# Patient Record
Sex: FEMALE | Race: WHITE | NOT HISPANIC OR LATINO | Employment: OTHER | URBAN - METROPOLITAN AREA
[De-identification: names, ages, dates, MRNs, and addresses within clinical notes are randomized per-mention and may not be internally consistent; named-entity substitution may affect disease eponyms.]

---

## 2017-01-27 ENCOUNTER — HOSPITAL ENCOUNTER (OUTPATIENT)
Dept: RADIOLOGY | Facility: HOSPITAL | Age: 72
Discharge: HOME | End: 2017-01-27
Payer: MEDICARE

## 2017-01-27 ENCOUNTER — LAB (OUTPATIENT)
Dept: LAB | Facility: HOSPITAL | Age: 72
End: 2017-01-27
Payer: MEDICARE

## 2017-01-27 ENCOUNTER — TRANSCRIBE ORDERS (OUTPATIENT)
Dept: RADIOLOGY | Facility: HOSPITAL | Age: 72
End: 2017-01-27

## 2017-01-27 DIAGNOSIS — E03.9 UNSPECIFIED HYPOTHYROIDISM: ICD-10-CM

## 2017-01-27 DIAGNOSIS — S33.8XXA SPRAIN OF SACRUM, INITIAL ENCOUNTER: ICD-10-CM

## 2017-01-27 DIAGNOSIS — M19.90 SENILE ARTHRITIS: ICD-10-CM

## 2017-01-27 DIAGNOSIS — E03.9 UNSPECIFIED HYPOTHYROIDISM: Primary | ICD-10-CM

## 2017-01-27 LAB
T3 SERPL-MCNC: 1 NG/ML (ref 0.6–1.8)
T4 FREE SERPL-MCNC: 1.24 NG/DL (ref 0.76–1.46)
TSH SERPL DL<=0.05 MIU/L-ACNC: 0.06 UIU/ML (ref 0.36–3.74)

## 2017-01-27 PROCEDURE — 72110 X-RAY EXAM L-2 SPINE 4/>VWS: CPT

## 2017-01-27 PROCEDURE — 73562 X-RAY EXAM OF KNEE 3: CPT

## 2017-01-27 PROCEDURE — 84480 ASSAY TRIIODOTHYRONINE (T3): CPT

## 2017-01-27 PROCEDURE — 36415 COLL VENOUS BLD VENIPUNCTURE: CPT

## 2017-01-27 PROCEDURE — 84439 ASSAY OF FREE THYROXINE: CPT

## 2017-01-27 PROCEDURE — 84443 ASSAY THYROID STIM HORMONE: CPT

## 2017-01-27 PROCEDURE — 72200 X-RAY EXAM SI JOINTS: CPT

## 2017-08-14 ENCOUNTER — LAB REQUISITION (OUTPATIENT)
Dept: LAB | Facility: HOSPITAL | Age: 72
End: 2017-08-14
Payer: MEDICARE

## 2017-08-14 DIAGNOSIS — R13.19 OTHER DYSPHAGIA: ICD-10-CM

## 2017-08-14 DIAGNOSIS — D12.7 BENIGN NEOPLASM OF RECTOSIGMOID JUNCTION: ICD-10-CM

## 2017-08-14 DIAGNOSIS — K44.9 DIAPHRAGMATIC HERNIA WITHOUT OBSTRUCTION OR GANGRENE: ICD-10-CM

## 2017-08-14 DIAGNOSIS — Z86.010 HISTORY OF COLONIC POLYPS: ICD-10-CM

## 2017-08-14 PROCEDURE — 88305 TISSUE EXAM BY PATHOLOGIST: CPT | Performed by: INTERNAL MEDICINE

## 2018-12-06 RX ORDER — ALPRAZOLAM 0.5 MG/1
TABLET ORAL
Refills: 5 | COMMUNITY
Start: 2018-11-06

## 2018-12-06 RX ORDER — FINASTERIDE 1 MG/1
1 TABLET, FILM COATED ORAL DAILY
Refills: 11 | COMMUNITY
Start: 2018-11-24

## 2018-12-06 RX ORDER — LEVOTHYROXINE SODIUM 125 UG/1
1 CAPSULE ORAL DAILY
Refills: 3 | COMMUNITY
Start: 2018-09-25 | End: 2018-12-13

## 2018-12-06 RX ORDER — LISINOPRIL 10 MG/1
5 TABLET ORAL DAILY
Refills: 4 | COMMUNITY
Start: 2018-10-09

## 2018-12-11 ENCOUNTER — OFFICE VISIT (OUTPATIENT)
Dept: ENDOCRINOLOGY | Facility: CLINIC | Age: 73
End: 2018-12-11
Payer: COMMERCIAL

## 2018-12-11 VITALS
HEART RATE: 70 BPM | DIASTOLIC BLOOD PRESSURE: 70 MMHG | HEIGHT: 66 IN | BODY MASS INDEX: 26.48 KG/M2 | WEIGHT: 164.8 LBS | SYSTOLIC BLOOD PRESSURE: 116 MMHG

## 2018-12-11 DIAGNOSIS — M85.80 OSTEOPENIA, UNSPECIFIED LOCATION: ICD-10-CM

## 2018-12-11 DIAGNOSIS — E55.9 VITAMIN D DEFICIENCY: ICD-10-CM

## 2018-12-11 DIAGNOSIS — E03.9 HYPOTHYROIDISM, UNSPECIFIED TYPE: Primary | ICD-10-CM

## 2018-12-11 PROCEDURE — 99204 OFFICE O/P NEW MOD 45 MIN: CPT | Performed by: INTERNAL MEDICINE

## 2018-12-11 RX ORDER — ASPIRIN 81 MG/1
81 TABLET ORAL DAILY
COMMUNITY

## 2018-12-11 NOTE — LETTER
December 12, 2018     Mg Mace MD  [address] [place] [address]    Patient: Adryan Elkins   YOB: 1945   Date of Visit: 12/11/2018       Dear Dr Courtney Crespo: Thank you for referring Teresa Longoria to me for evaluation  Below are my notes for this consultation  If you have questions, please do not hesitate to call me  I look forward to following your patient along with you  Sincerely,        Pam Brunner MD        CC: No Recipients  Pam Brunner MD  12/12/2018 12:35 PM  Sign at close encounter   Adryan Elkins 72 y o  female MRN: [id number]    Encounter: [id number]      Assessment/Plan     Problem List Items Addressed This Visit     Hypothyroidism - Primary     Agree with stopping Cytomel as TSH was suppressed  For now continue Tirosint at current dose  I a m  Going to check thyroid function tests-if the TSH is still on the lower end of normal consider cutting back on Tirosint         Relevant Medications    TIROSINT 125 MCG CAPS    Other Relevant Orders    TSH, 3rd generation Lab Collect    T4, free Lab Collect    Comprehensive metabolic panel Lab Collect    Vitamin D deficiency     Continue vitamin-D supplementations-will check vitamin-D 25 hydroxy         Relevant Orders    Vitamin D 25 hydroxy Lab Collect    Comprehensive metabolic panel Lab Collect    Osteopenia     Counseled on taking vitamin-D and calcium supplementations-counseled on fall prevention  Will request a DEXA         Relevant Orders    Comprehensive metabolic panel Lab Collect    DXA bone density spine hip and pelvis        CC:   Hypothyroidism    History of Present Illness     HPI:  81-year-old woman here for evaluation of hypothyroidism  She has had hypothyroidism for many years and  Was on cytomel + tirosnt     Last year TSH was suppressed so Cytomel was discontinued and she is currently on Tirosint 125 mcg daily  Since then she states that she has been noticing Hot flashes at night Weight gain -10 lbs in the past 2 years   Complains of  Fatigue   Sleep issues - cant sleep well for many years   Usually has 3 BM /day -   Feeling more depressed , anxious  No palpitations ,  Heat intolerance  History of osteopenia-no history of fragility fractures   has not taken any vitamin D in the past 2 months   Calcium - 500 mg once  A day - 1 serving of dairy a day         Review of Systems   Constitutional: Positive for fatigue and unexpected weight change  Respiratory: Negative for cough and shortness of breath  Cardiovascular: Negative for palpitations and leg swelling  Gastrointestinal: Negative for diarrhea, nausea and vomiting  Endocrine: Negative for polydipsia and polyuria  Musculoskeletal: Negative for joint swelling  Skin: Negative for color change, pallor, rash and wound  Psychiatric/Behavioral: Positive for dysphoric mood and sleep disturbance  The patient is nervous/anxious  All other systems reviewed and are negative        Historical Information   Past Medical History:   Diagnosis Date    Arthritis     PTSD (post-traumatic stress disorder)      Past Surgical History:   Procedure Laterality Date    BUNIONECTOMY      REPLACEMENT TOTAL KNEE       Social History   History   Alcohol Use    Yes     Comment: Social     History   Drug Use No     History   Smoking Status    Never Smoker   Smokeless Tobacco    Never Used     Family History:   Family History   Problem Relation Age of Onset    Heart disease Mother     Stroke Mother     Lung disease Father        Meds/Allergies   Current Outpatient Prescriptions   Medication Sig Dispense Refill    ALPRAZolam (XANAX) 0 5 mg tablet TAKE 1 TABLET EVERY 8 HOURS AS NEEDED  5    aspirin (ECOTRIN LOW STRENGTH) 81 mg EC tablet Take 81 mg by mouth daily      finasteride (PROPECIA) 1 MG tablet Take 1 mg by mouth daily  11    lisinopril (ZESTRIL) 10 mg tablet Take 10 mg by mouth daily  4    TIROSINT 125 MCG CAPS Take 1 capsule by mouth daily  3     No current facility-administered medications for this visit  No Known Allergies    Objective   Vitals: Blood pressure 116/70, pulse 70, height 5' 6" (1 676 m), weight 74 8 kg (164 lb 12 8 oz)  Physical Exam   Constitutional: She is oriented to person, place, and time  She appears well-developed and well-nourished  No distress  HENT:   Head: Normocephalic and atraumatic  Mouth/Throat: No oropharyngeal exudate  Eyes: Conjunctivae and EOM are normal  No scleral icterus  Neck: Normal range of motion  Neck supple  No thyromegaly present  Cardiovascular: Normal rate, regular rhythm and normal heart sounds  No murmur heard  Pulmonary/Chest: Effort normal and breath sounds normal  No respiratory distress  She has no wheezes  She has no rales  Abdominal: Soft  Bowel sounds are normal  She exhibits no distension  There is no tenderness  There is no rebound  Musculoskeletal: Normal range of motion  She exhibits no edema or deformity  Lymphadenopathy:     She has no cervical adenopathy  Neurological: She is alert and oriented to person, place, and time  Skin: Skin is warm and dry  No rash noted  No erythema  No pallor  Psychiatric: She has a normal mood and affect  Her behavior is normal  Judgment and thought content normal        The history was obtained from the review of the chart, patient  Lab Results:      January 2017-TSH 0 056  Free T4 1 24 April 2017-TSH 0 39      Portions of the record may have been created with voice recognition software  Occasional wrong word or "sound a like" substitutions may have occurred due to the inherent limitations of voice recognition software  Read the chart carefully and recognize, using context, where substitutions have occurred

## 2018-12-11 NOTE — PROGRESS NOTES
Betzy Guzman 72 y o  female MRN: [id number]    Encounter: [id number]      Assessment/Plan     Problem List Items Addressed This Visit     Hypothyroidism - Primary     Agree with stopping Cytomel as TSH was suppressed  For now continue Tirosint at current dose  I a m  Going to check thyroid function tests-if the TSH is still on the lower end of normal consider cutting back on Tirosint         Relevant Medications    TIROSINT 125 MCG CAPS    Other Relevant Orders    TSH, 3rd generation Lab Collect    T4, free Lab Collect    Comprehensive metabolic panel Lab Collect    Vitamin D deficiency     Continue vitamin-D supplementations-will check vitamin-D 25 hydroxy         Relevant Orders    Vitamin D 25 hydroxy Lab Collect    Comprehensive metabolic panel Lab Collect    Osteopenia     Counseled on taking vitamin-D and calcium supplementations-counseled on fall prevention  Will request a DEXA         Relevant Orders    Comprehensive metabolic panel Lab Collect    DXA bone density spine hip and pelvis        CC:   Hypothyroidism    History of Present Illness     HPI:  82-year-old woman here for evaluation of hypothyroidism  She has had hypothyroidism for many years and  Was on cytomel + tirosnt   Last year TSH was suppressed so Cytomel was discontinued and she is currently on Tirosint 125 mcg daily  Since then she states that she has been noticing Hot flashes at night   Weight gain -10 lbs in the past 2 years   Complains of  Fatigue   Sleep issues - cant sleep well for many years   Usually has 3 BM /day -   Feeling more depressed , anxious  No palpitations ,  Heat intolerance  History of osteopenia-no history of fragility fractures   has not taken any vitamin D in the past 2 months   Calcium - 500 mg once  A day - 1 serving of dairy a day         Review of Systems   Constitutional: Positive for fatigue and unexpected weight change  Respiratory: Negative for cough and shortness of breath      Cardiovascular: Negative for palpitations and leg swelling  Gastrointestinal: Negative for diarrhea, nausea and vomiting  Endocrine: Negative for polydipsia and polyuria  Musculoskeletal: Negative for joint swelling  Skin: Negative for color change, pallor, rash and wound  Psychiatric/Behavioral: Positive for dysphoric mood and sleep disturbance  The patient is nervous/anxious  All other systems reviewed and are negative  Historical Information   Past Medical History:   Diagnosis Date    Arthritis     PTSD (post-traumatic stress disorder)      Past Surgical History:   Procedure Laterality Date    BUNIONECTOMY      REPLACEMENT TOTAL KNEE       Social History   History   Alcohol Use    Yes     Comment: Social     History   Drug Use No     History   Smoking Status    Never Smoker   Smokeless Tobacco    Never Used     Family History:   Family History   Problem Relation Age of Onset    Heart disease Mother     Stroke Mother     Lung disease Father        Meds/Allergies   Current Outpatient Prescriptions   Medication Sig Dispense Refill    ALPRAZolam (XANAX) 0 5 mg tablet TAKE 1 TABLET EVERY 8 HOURS AS NEEDED  5    aspirin (ECOTRIN LOW STRENGTH) 81 mg EC tablet Take 81 mg by mouth daily      finasteride (PROPECIA) 1 MG tablet Take 1 mg by mouth daily  11    lisinopril (ZESTRIL) 10 mg tablet Take 10 mg by mouth daily  4    TIROSINT 125 MCG CAPS Take 1 capsule by mouth daily  3     No current facility-administered medications for this visit  No Known Allergies    Objective   Vitals: Blood pressure 116/70, pulse 70, height 5' 6" (1 676 m), weight 74 8 kg (164 lb 12 8 oz)  Physical Exam   Constitutional: She is oriented to person, place, and time  She appears well-developed and well-nourished  No distress  HENT:   Head: Normocephalic and atraumatic  Mouth/Throat: No oropharyngeal exudate  Eyes: Conjunctivae and EOM are normal  No scleral icterus  Neck: Normal range of motion  Neck supple   No thyromegaly present  Cardiovascular: Normal rate, regular rhythm and normal heart sounds  No murmur heard  Pulmonary/Chest: Effort normal and breath sounds normal  No respiratory distress  She has no wheezes  She has no rales  Abdominal: Soft  Bowel sounds are normal  She exhibits no distension  There is no tenderness  There is no rebound  Musculoskeletal: Normal range of motion  She exhibits no edema or deformity  Lymphadenopathy:     She has no cervical adenopathy  Neurological: She is alert and oriented to person, place, and time  Skin: Skin is warm and dry  No rash noted  No erythema  No pallor  Psychiatric: She has a normal mood and affect  Her behavior is normal  Judgment and thought content normal        The history was obtained from the review of the chart, patient  Lab Results:      January 2017-TSH 0 056  Free T4 1 24 April 2017-TSH 0 39      Portions of the record may have been created with voice recognition software  Occasional wrong word or "sound a like" substitutions may have occurred due to the inherent limitations of voice recognition software  Read the chart carefully and recognize, using context, where substitutions have occurred

## 2018-12-12 NOTE — ASSESSMENT & PLAN NOTE
Counseled on taking vitamin-D and calcium supplementations-counseled on fall prevention    Will request a DEXA

## 2018-12-12 NOTE — ASSESSMENT & PLAN NOTE
Agree with stopping Cytomel as TSH was suppressed  For now continue Tirosint at current dose  I a m   Going to check thyroid function tests-if the TSH is still on the lower end of normal consider cutting back on Tirosint

## 2018-12-13 ENCOUNTER — TELEPHONE (OUTPATIENT)
Dept: ENDOCRINOLOGY | Facility: CLINIC | Age: 73
End: 2018-12-13

## 2018-12-13 DIAGNOSIS — E03.9 HYPOTHYROIDISM, UNSPECIFIED TYPE: Primary | ICD-10-CM

## 2018-12-13 LAB
25(OH)D3 SERPL-MCNC: 38 NG/ML (ref 30–100)
ALBUMIN SERPL-MCNC: 4.3 G/DL (ref 3.6–5.1)
ALBUMIN/GLOB SERPL: 1.7 (CALC) (ref 1–2.5)
ALP SERPL-CCNC: 59 U/L (ref 33–130)
ALT SERPL-CCNC: 13 U/L (ref 6–29)
AST SERPL-CCNC: 15 U/L (ref 10–35)
BILIRUB SERPL-MCNC: 0.7 MG/DL (ref 0.2–1.2)
BUN SERPL-MCNC: 18 MG/DL (ref 7–25)
BUN/CREAT SERPL: ABNORMAL (CALC) (ref 6–22)
CALCIUM SERPL-MCNC: 9.3 MG/DL (ref 8.6–10.4)
CHLORIDE SERPL-SCNC: 103 MMOL/L (ref 98–110)
CO2 SERPL-SCNC: 29 MMOL/L (ref 20–32)
CREAT SERPL-MCNC: 0.68 MG/DL (ref 0.6–0.93)
GLOBULIN SER CALC-MCNC: 2.6 G/DL (CALC) (ref 1.9–3.7)
GLUCOSE SERPL-MCNC: 102 MG/DL (ref 65–99)
POTASSIUM SERPL-SCNC: 4.5 MMOL/L (ref 3.5–5.3)
PROT SERPL-MCNC: 6.9 G/DL (ref 6.1–8.1)
SL AMB EGFR AFRICAN AMERICAN: 102 ML/MIN/1.73M2
SL AMB EGFR NON AFRICAN AMERICAN: 88 ML/MIN/1.73M2
SODIUM SERPL-SCNC: 139 MMOL/L (ref 135–146)
T4 FREE SERPL-MCNC: 1.7 NG/DL (ref 0.8–1.8)
TSH SERPL-ACNC: 0.39 MIU/L (ref 0.4–4.5)

## 2018-12-13 RX ORDER — LEVOTHYROXINE SODIUM 112 UG/1
112 CAPSULE ORAL DAILY
Qty: 30 CAPSULE | Refills: 4 | Status: SHIPPED | OUTPATIENT
Start: 2018-12-13 | End: 2019-01-22 | Stop reason: DRUGHIGH

## 2018-12-13 NOTE — PROGRESS NOTES
Please call the patient regarding labs - TSH is slightly low-discontinue Tirosint 125 mcg-start tirosint  112 mcg daily- repeat TSH and free T4 in 6 weeks-vitamin-D okay-continue supplementations  Is this  a fasting glucose?   If so it is slightly high

## 2018-12-13 NOTE — TELEPHONE ENCOUNTER
----- Message from Alta Dolan MD sent at 12/13/2018  9:03 AM EST -----  Please call the patient regarding labs - TSH is slightly low-discontinue Tirosint 125 mcg-start tirosint  112 mcg daily- repeat TSH and free T4 in 6 weeks-vitamin-D okay-continue supplementations  Is this  a fasting glucose?   If so it is slightly high

## 2018-12-13 NOTE — TELEPHONE ENCOUNTER
----- Message from Virginia Rhoades MD sent at 12/13/2018  9:03 AM EST -----  Please call the patient regarding labs - TSH is slightly low-discontinue Tirosint 125 mcg-start tirosint  112 mcg daily- repeat TSH and free T4 in 6 weeks-vitamin-D okay-continue supplementations  Is this  a fasting glucose?   If so it is slightly high

## 2019-01-18 LAB
T4 FREE SERPL-MCNC: 2.1 NG/DL (ref 0.8–1.8)
TSH SERPL-ACNC: 0.13 MIU/L (ref 0.4–4.5)

## 2019-01-21 ENCOUNTER — TRANSCRIBE ORDERS (OUTPATIENT)
Dept: RADIOLOGY | Facility: HOSPITAL | Age: 74
End: 2019-01-21

## 2019-01-21 ENCOUNTER — HOSPITAL ENCOUNTER (OUTPATIENT)
Dept: RADIOLOGY | Facility: HOSPITAL | Age: 74
Discharge: HOME | End: 2019-01-21
Payer: COMMERCIAL

## 2019-01-21 DIAGNOSIS — J18.9 UNRESOLVED PNEUMONIA: Primary | ICD-10-CM

## 2019-01-21 DIAGNOSIS — J18.9 UNRESOLVED PNEUMONIA: ICD-10-CM

## 2019-01-21 PROCEDURE — 71046 X-RAY EXAM CHEST 2 VIEWS: CPT

## 2019-01-22 ENCOUNTER — OFFICE VISIT (OUTPATIENT)
Dept: ENDOCRINOLOGY | Facility: CLINIC | Age: 74
End: 2019-01-22
Payer: COMMERCIAL

## 2019-01-22 VITALS
HEART RATE: 85 BPM | BODY MASS INDEX: 25.97 KG/M2 | WEIGHT: 161.6 LBS | HEIGHT: 66 IN | SYSTOLIC BLOOD PRESSURE: 110 MMHG | DIASTOLIC BLOOD PRESSURE: 78 MMHG

## 2019-01-22 DIAGNOSIS — M85.80 OSTEOPENIA, UNSPECIFIED LOCATION: ICD-10-CM

## 2019-01-22 DIAGNOSIS — E03.9 HYPOTHYROIDISM, UNSPECIFIED TYPE: Primary | ICD-10-CM

## 2019-01-22 DIAGNOSIS — E55.9 VITAMIN D DEFICIENCY: ICD-10-CM

## 2019-01-22 PROCEDURE — 99214 OFFICE O/P EST MOD 30 MIN: CPT | Performed by: NURSE PRACTITIONER

## 2019-01-22 RX ORDER — LEVOTHYROXINE SODIUM 100 UG/1
100 CAPSULE ORAL DAILY
Qty: 30 CAPSULE | Refills: 1 | Status: SHIPPED | OUTPATIENT
Start: 2019-01-22 | End: 2019-03-12 | Stop reason: DRUGHIGH

## 2019-01-22 NOTE — ASSESSMENT & PLAN NOTE
Continue calcium and vitamin d supplementation  Educated on falls prevention and importance of weight bearing exercise  She has dexa scan scheduled for 1/28

## 2019-01-22 NOTE — PROGRESS NOTES
Established Patient Progress Note       Chief Complaint   Patient presents with    Hypothyroidism        History of Present Illness:     Brennen Velasquez is a 72 y o  female with a history of hypothyroidism, vitamin d deficiency, and osteopenia  For the hypothyroidism she is currently taking Tirosint 112 mcg daily  She reports she is taking this regularly and properly  Her most recent thyroid function test showed suppressed TSH with elevated free T4  She reports hair loss, insomnia, and anxiety  She denies palpitations or tremors  For the osteopenia she is taking calcium and vitamin d supplementation  She denies any recent falls or fractures  Patient Active Problem List   Diagnosis    Hypothyroidism    Vitamin D deficiency    Osteopenia      Past Medical History:   Diagnosis Date    Arthritis     PTSD (post-traumatic stress disorder)       Past Surgical History:   Procedure Laterality Date    BUNIONECTOMY      REPLACEMENT TOTAL KNEE        Family History   Problem Relation Age of Onset    Heart disease Mother     Stroke Mother     Lung disease Father      Social History   Substance Use Topics    Smoking status: Never Smoker    Smokeless tobacco: Never Used    Alcohol use Yes      Comment: Social     No Known Allergies    Current Outpatient Prescriptions:     ALPRAZolam (XANAX) 0 5 mg tablet, TAKE 1 TABLET EVERY 8 HOURS AS NEEDED, Disp: , Rfl: 5    aspirin (ECOTRIN LOW STRENGTH) 81 mg EC tablet, Take 81 mg by mouth daily, Disp: , Rfl:     finasteride (PROPECIA) 1 MG tablet, Take 1 mg by mouth daily, Disp: , Rfl: 11    lisinopril (ZESTRIL) 10 mg tablet, Take 10 mg by mouth daily, Disp: , Rfl: 4    Levothyroxine Sodium (TIROSINT) 100 MCG CAPS, Take 1 capsule (100 mcg total) by mouth daily Tirosint, brand necessary, Disp: 30 capsule, Rfl: 1    Review of Systems   Constitutional: Negative for chills and fever  HENT: Negative for sore throat and trouble swallowing      Eyes: Negative for visual disturbance  Respiratory: Negative for shortness of breath  Cardiovascular: Negative for chest pain and palpitations  Gastrointestinal: Negative for abdominal pain, constipation and diarrhea  Endocrine: Negative for cold intolerance, heat intolerance, polydipsia, polyphagia and polyuria  Genitourinary: Negative for frequency  Musculoskeletal: Negative for arthralgias and myalgias  Skin: Negative for rash  Neurological: Negative for dizziness and tremors  Hematological: Negative for adenopathy  Psychiatric/Behavioral: Positive for sleep disturbance  The patient is nervous/anxious  All other systems reviewed and are negative  Physical Exam:  Body mass index is 26 08 kg/m²  /78   Pulse 85   Ht 5' 6" (1 676 m)   Wt 73 3 kg (161 lb 9 6 oz)   BMI 26 08 kg/m²    Wt Readings from Last 3 Encounters:   01/22/19 73 3 kg (161 lb 9 6 oz)   12/11/18 74 8 kg (164 lb 12 8 oz)       Physical Exam   Constitutional: She is oriented to person, place, and time  She appears well-developed and well-nourished  No distress  HENT:   Head: Normocephalic and atraumatic  Eyes: Pupils are equal, round, and reactive to light  Conjunctivae are normal    Neck: Normal range of motion  Neck supple  No thyromegaly present  Cardiovascular: Normal rate, regular rhythm and normal heart sounds  Pulmonary/Chest: Effort normal and breath sounds normal  No respiratory distress  She has no wheezes  She has no rales  Abdominal: Soft  Bowel sounds are normal  She exhibits no distension  There is no tenderness  Musculoskeletal: Normal range of motion  She exhibits no edema  Neurological: She is alert and oriented to person, place, and time  Skin: Skin is warm and dry  Psychiatric: She has a normal mood and affect  Vitals reviewed        Labs:       Component      Latest Ref Rng & Units 1/17/2019   Free T4      0 8 - 1 8 ng/dL 2 1 (H)   TSH, POC      0 40 - 4 50 mIU/L 0 13 (L)       Impression & Plan:    Problem List Items Addressed This Visit     Hypothyroidism - Primary     TSH suppressed with elevated free T4, decrease Tirosint to 100 mcg daily  Repeat TSH and free T4 in 6 weeks  Relevant Medications    Levothyroxine Sodium (TIROSINT) 100 MCG CAPS    Other Relevant Orders    T4, free    TSH, 3rd generation    Vitamin D deficiency     Continue vitamin d supplementation          Osteopenia     Continue calcium and vitamin d supplementation  Educated on falls prevention and importance of weight bearing exercise  She has dexa scan scheduled for 1/28  Orders Placed This Encounter   Procedures    T4, free     Standing Status:   Future     Standing Expiration Date:   1/22/2020    TSH, 3rd generation     This is a patient instruction: This test is non-fasting  Please drink two glasses of water morning of bloodwork  Standing Status:   Future     Standing Expiration Date:   1/22/2020       Discussed with the patient and all questioned fully answered  She will call me if any problems arise      Counseled patient on diagnostic results, prognosis, risk and benefit of treatment options, instruction for management, importance of treatment compliance, Risk  factor reduction and impressions      Gopi Ball [address]

## 2019-01-22 NOTE — ASSESSMENT & PLAN NOTE
TSH suppressed with elevated free T4, decrease Tirosint to 100 mcg daily  Repeat TSH and free T4 in 6 weeks

## 2019-01-28 ENCOUNTER — HOSPITAL ENCOUNTER (OUTPATIENT)
Dept: RADIOLOGY | Age: 74
Discharge: HOME | End: 2019-01-28
Payer: COMMERCIAL

## 2019-01-28 DIAGNOSIS — M85.80 OSTEOPENIA, UNSPECIFIED LOCATION: ICD-10-CM

## 2019-01-28 PROCEDURE — 77080 DXA BONE DENSITY AXIAL: CPT

## 2019-01-30 ENCOUNTER — TELEPHONE (OUTPATIENT)
Dept: ENDOCRINOLOGY | Facility: CLINIC | Age: 74
End: 2019-01-30

## 2019-01-30 NOTE — TELEPHONE ENCOUNTER
----- Message from Carolyne Leigh MD sent at 1/29/2019  7:20 PM EST -----  Please call the patient regarding dexa - osteopenia , continue calcium and vitamin D

## 2019-03-12 DIAGNOSIS — E03.9 HYPOTHYROIDISM, UNSPECIFIED TYPE: Primary | ICD-10-CM

## 2019-03-12 DIAGNOSIS — E03.8 TSH (THYROID-STIMULATING HORMONE DEFICIENCY): Primary | ICD-10-CM

## 2019-03-12 LAB
T4 FREE SERPL-MCNC: 1.6 NG/DL (ref 0.8–1.8)
TSH SERPL-ACNC: 0.24 MIU/L (ref 0.4–4.5)

## 2019-03-12 RX ORDER — LEVOTHYROXINE SODIUM 88 UG/1
CAPSULE ORAL
COMMUNITY
End: 2019-03-12 | Stop reason: DRUGHIGH

## 2019-03-12 RX ORDER — LEVOTHYROXINE SODIUM 88 UG/1
88 CAPSULE ORAL DAILY
Qty: 30 CAPSULE | Refills: 2 | Status: SHIPPED | OUTPATIENT
Start: 2019-03-12 | End: 2019-05-07 | Stop reason: SDUPTHER

## 2019-03-12 NOTE — TELEPHONE ENCOUNTER
Pt is aware of results and medication change and to do blood work in six weeks and mailed out script

## 2019-04-13 LAB
T4 FREE SERPL-MCNC: 1.5 NG/DL (ref 0.8–1.8)
TSH SERPL-ACNC: 0.91 MIU/L (ref 0.4–4.5)

## 2019-04-23 ENCOUNTER — TELEPHONE (OUTPATIENT)
Dept: ENDOCRINOLOGY | Facility: CLINIC | Age: 74
End: 2019-04-23

## 2019-04-25 ENCOUNTER — OFFICE VISIT (OUTPATIENT)
Dept: ENDOCRINOLOGY | Facility: CLINIC | Age: 74
End: 2019-04-25
Payer: COMMERCIAL

## 2019-04-25 VITALS
HEART RATE: 89 BPM | BODY MASS INDEX: 23.46 KG/M2 | DIASTOLIC BLOOD PRESSURE: 70 MMHG | HEIGHT: 66 IN | WEIGHT: 146 LBS | SYSTOLIC BLOOD PRESSURE: 108 MMHG

## 2019-04-25 DIAGNOSIS — E03.9 HYPOTHYROIDISM, UNSPECIFIED TYPE: Primary | ICD-10-CM

## 2019-04-25 DIAGNOSIS — E55.9 VITAMIN D DEFICIENCY: ICD-10-CM

## 2019-04-25 DIAGNOSIS — R73.9 HYPERGLYCEMIA: ICD-10-CM

## 2019-04-25 DIAGNOSIS — M85.80 OSTEOPENIA, UNSPECIFIED LOCATION: ICD-10-CM

## 2019-04-25 PROCEDURE — 99214 OFFICE O/P EST MOD 30 MIN: CPT | Performed by: INTERNAL MEDICINE

## 2019-05-07 DIAGNOSIS — E03.9 HYPOTHYROIDISM, UNSPECIFIED TYPE: ICD-10-CM

## 2019-05-07 RX ORDER — LEVOTHYROXINE SODIUM 88 UG/1
88 CAPSULE ORAL DAILY
Qty: 30 CAPSULE | Refills: 2 | Status: SHIPPED | OUTPATIENT
Start: 2019-05-07

## 2019-06-01 LAB
ALBUMIN SERPL-MCNC: 4.4 G/DL (ref 3.6–5.1)
ALBUMIN/GLOB SERPL: 2 (CALC) (ref 1–2.5)
ALP SERPL-CCNC: 48 U/L (ref 33–130)
ALT SERPL-CCNC: 54 U/L (ref 6–29)
AST SERPL-CCNC: 63 U/L (ref 10–35)
BILIRUB SERPL-MCNC: 0.6 MG/DL (ref 0.2–1.2)
BUN SERPL-MCNC: 12 MG/DL (ref 7–25)
BUN/CREAT SERPL: ABNORMAL (CALC) (ref 6–22)
CALCIUM SERPL-MCNC: 9.6 MG/DL (ref 8.6–10.4)
CHLORIDE SERPL-SCNC: 103 MMOL/L (ref 98–110)
CHOLEST SERPL-MCNC: 254 MG/DL
CHOLEST/HDLC SERPL: 2.3 (CALC)
CO2 SERPL-SCNC: 28 MMOL/L (ref 20–32)
CREAT SERPL-MCNC: 0.68 MG/DL (ref 0.6–0.93)
EST. AVERAGE GLUCOSE BLD GHB EST-MCNC: 103 (CALC)
EST. AVERAGE GLUCOSE BLD GHB EST-SCNC: 5.7 (CALC)
GLOBULIN SER CALC-MCNC: 2.2 G/DL (CALC) (ref 1.9–3.7)
GLUCOSE SERPL-MCNC: 88 MG/DL (ref 65–99)
HBA1C MFR BLD: 5.2 % OF TOTAL HGB
HDLC SERPL-MCNC: 109 MG/DL
LDLC SERPL CALC-MCNC: 127 MG/DL (CALC)
NONHDLC SERPL-MCNC: 145 MG/DL (CALC)
POTASSIUM SERPL-SCNC: 4.8 MMOL/L (ref 3.5–5.3)
PROT SERPL-MCNC: 6.6 G/DL (ref 6.1–8.1)
SL AMB EGFR AFRICAN AMERICAN: 101 ML/MIN/1.73M2
SL AMB EGFR NON AFRICAN AMERICAN: 87 ML/MIN/1.73M2
SODIUM SERPL-SCNC: 142 MMOL/L (ref 135–146)
T4 FREE SERPL-MCNC: 1.6 NG/DL (ref 0.8–1.8)
TRIGL SERPL-MCNC: 84 MG/DL
TSH SERPL-ACNC: 0.96 MIU/L (ref 0.4–4.5)

## 2019-06-06 ENCOUNTER — TELEPHONE (OUTPATIENT)
Dept: ENDOCRINOLOGY | Facility: CLINIC | Age: 74
End: 2019-06-06

## 2019-06-12 ENCOUNTER — TELEPHONE (OUTPATIENT)
Dept: ENDOCRINOLOGY | Facility: CLINIC | Age: 74
End: 2019-06-12

## 2019-08-14 ENCOUNTER — LAB REQUISITION (OUTPATIENT)
Dept: LAB | Facility: HOSPITAL | Age: 74
End: 2019-08-14
Payer: COMMERCIAL

## 2019-08-14 ENCOUNTER — TRANSCRIBE ORDERS (OUTPATIENT)
Dept: ADMINISTRATIVE | Facility: HOSPITAL | Age: 74
End: 2019-08-14

## 2019-08-14 DIAGNOSIS — R10.2 PERINEAL NEURALGIA, UNSPECIFIED LATERALITY: Primary | ICD-10-CM

## 2019-08-14 DIAGNOSIS — R94.5 ABNORMAL RESULTS OF LIVER FUNCTION STUDIES: ICD-10-CM

## 2019-08-14 LAB
ALBUMIN SERPL BCP-MCNC: 4.2 G/DL (ref 3.5–5)
ALP SERPL-CCNC: 62 U/L (ref 46–116)
ALT SERPL W P-5'-P-CCNC: 44 U/L (ref 12–78)
ANION GAP SERPL CALCULATED.3IONS-SCNC: 9 MMOL/L (ref 4–13)
AST SERPL W P-5'-P-CCNC: 40 U/L (ref 5–45)
BILIRUB SERPL-MCNC: 0.79 MG/DL (ref 0.2–1)
BUN SERPL-MCNC: 14 MG/DL (ref 5–25)
CALCIUM SERPL-MCNC: 9.4 MG/DL (ref 8.3–10.1)
CHLORIDE SERPL-SCNC: 103 MMOL/L (ref 100–108)
CO2 SERPL-SCNC: 26 MMOL/L (ref 21–32)
CREAT SERPL-MCNC: 0.66 MG/DL (ref 0.6–1.3)
GFR SERPL CREATININE-BSD FRML MDRD: 89 ML/MIN/1.73SQ M
GLUCOSE SERPL-MCNC: 83 MG/DL (ref 65–140)
POTASSIUM SERPL-SCNC: 4.7 MMOL/L (ref 3.5–5.3)
PROT SERPL-MCNC: 7.4 G/DL (ref 6.4–8.2)
SODIUM SERPL-SCNC: 138 MMOL/L (ref 136–145)

## 2019-08-14 PROCEDURE — 80053 COMPREHEN METABOLIC PANEL: CPT | Performed by: INTERNAL MEDICINE

## 2019-08-21 ENCOUNTER — HOSPITAL ENCOUNTER (OUTPATIENT)
Dept: RADIOLOGY | Age: 74
Discharge: HOME | End: 2019-08-21
Payer: COMMERCIAL

## 2019-08-21 DIAGNOSIS — R10.2 PERINEAL NEURALGIA, UNSPECIFIED LATERALITY: ICD-10-CM

## 2019-08-21 PROCEDURE — 76830 TRANSVAGINAL US NON-OB: CPT

## 2019-08-21 PROCEDURE — 76856 US EXAM PELVIC COMPLETE: CPT

## 2019-09-04 ENCOUNTER — OFFICE VISIT (OUTPATIENT)
Dept: OBGYN CLINIC | Facility: CLINIC | Age: 74
End: 2019-09-04
Payer: COMMERCIAL

## 2019-09-04 VITALS
DIASTOLIC BLOOD PRESSURE: 78 MMHG | HEIGHT: 65 IN | BODY MASS INDEX: 22.26 KG/M2 | WEIGHT: 133.6 LBS | SYSTOLIC BLOOD PRESSURE: 148 MMHG

## 2019-09-04 DIAGNOSIS — R93.89 ENDOMETRIAL THICKENING ON ULTRASOUND: Primary | ICD-10-CM

## 2019-09-04 PROCEDURE — 88305 TISSUE EXAM BY PATHOLOGIST: CPT | Performed by: PATHOLOGY

## 2019-09-04 PROCEDURE — 99204 OFFICE O/P NEW MOD 45 MIN: CPT | Performed by: OBSTETRICS & GYNECOLOGY

## 2019-09-06 ENCOUNTER — TELEPHONE (OUTPATIENT)
Dept: OBGYN CLINIC | Facility: CLINIC | Age: 74
End: 2019-09-06

## 2019-09-06 NOTE — TELEPHONE ENCOUNTER
----- Message from Brett Sen MD sent at 9/6/2019  1:08 PM EDT -----  Notify benign! No follow up necessary

## 2019-09-08 PROCEDURE — 58100 BIOPSY OF UTERUS LINING: CPT | Performed by: OBSTETRICS & GYNECOLOGY

## 2019-09-08 NOTE — PROGRESS NOTES
GYN Problem Visit    HPI:  Patient is a 69 y o postmenopausal female who was found to have lower abdominal/pelvic tenderness of exam by PCP  Pelvic sono was ordered which revealed 6mm endometrial stripe  She denies any VB since the menopause  Pelvic sono was otherwise normal       OB History        3    Para   2    Term                AB   1    Living           SAB        TAB        Ectopic        Multiple        Live Births                     Past Medical History:   Diagnosis Date    Arthritis     PTSD (post-traumatic stress disorder)        Past Surgical History:   Procedure Laterality Date    BUNIONECTOMY Right     KNEE ARTHROSCOPY W/ MENISCAL REPAIR Right 2009    REPLACEMENT TOTAL KNEE Right 2017         Current Outpatient Medications:     ALPRAZolam (XANAX) 0 5 mg tablet, TAKE 1 TABLET EVERY 8 HOURS AS NEEDED, Disp: , Rfl: 5    finasteride (PROPECIA) 1 MG tablet, Take 1 mg by mouth daily, Disp: , Rfl: 11    Levothyroxine Sodium 88 MCG CAPS, Take 1 capsule (88 mcg total) by mouth daily, Disp: 30 capsule, Rfl: 2    lisinopril (ZESTRIL) 10 mg tablet, Take 5 mg by mouth daily , Disp: , Rfl: 4    aspirin (ECOTRIN LOW STRENGTH) 81 mg EC tablet, Take 81 mg by mouth daily, Disp: , Rfl:     No Known Allergies    Social History     Tobacco Use    Smoking status: Former Smoker     Last attempt to quit: 1977     Years since quittin 0    Smokeless tobacco: Never Used   Substance Use Topics    Alcohol use: Yes     Comment: Social    Drug use: No       Family History   Problem Relation Age of Onset    Heart disease Mother     Stroke Mother     Lung disease Father      Review of Systems   Constitution: Negative for decreased appetite, fever, malaise/fatigue and weight loss  Musculoskeletal: Negative for arthritis, back pain, joint pain and muscle weakness  Gastrointestinal: Negative for bloating, abdominal pain, change in bowel habit and nausea     Genitourinary: Negative for bladder incontinence, dysuria, pelvic pain and urgency  Vitals:    09/04/19 1002   BP: 148/78       Physical Exam   Constitutional: She is oriented to person, place, and time  She appears well-developed and well-nourished  Genitourinary: Vagina normal  No vaginal discharge found  HENT:   Head: Normocephalic  Cardiovascular: Normal rate  Pulmonary/Chest: Effort normal    Abdominal: Soft  There is no tenderness  Musculoskeletal: She exhibits no edema  Neurological: She is alert and oriented to person, place, and time  Skin: Skin is warm and dry  Psychiatric: She has a normal mood and affect  Vitals reviewed  Impression: Thickened endometrium on sono - asymptomatic    Plan:  Discussed need to proceed with EMB to prove no occult malignancy  If adequate and benign no further follow up necessary  If inadequate or atypical plan D&C/HSC

## 2019-09-08 NOTE — PROGRESS NOTES
Endometrial biopsy  Date/Time: 9/8/2019 2:27 PM  Performed by: Manish Haro MD  Authorized by: Manish Haro MD     Consent:     Consent obtained:  Written and verbal    Consent given by:  Patient    Procedural risks discussed:  Bleeding, infection and repeat procedure    Patient questions answered: yes      Patient agrees, verbalizes understanding, and wants to proceed: yes    Indication:     Indications comment:   Thickened endometrium on sono  Procedure:     Procedure: endometrial biopsy with Pipelle      A bivalve speculum was placed in the vagina: yes      Cervix cleaned and prepped: yes      The cervix was dilated: no      Uterus sounded: yes      Uterus sound depth (cm):  7    Specimen collected: specimen collected and sent to pathology    Findings:     Uterus size:  Non-gravid    Cervix: normal      Adnexa: normal

## 2021-02-25 ENCOUNTER — VBI (OUTPATIENT)
Dept: ADMINISTRATIVE | Facility: OTHER | Age: 76
End: 2021-02-25

## 2021-03-03 ENCOUNTER — IMMUNIZATIONS (OUTPATIENT)
Dept: FAMILY MEDICINE CLINIC | Facility: HOSPITAL | Age: 76
End: 2021-03-03

## 2021-03-03 DIAGNOSIS — Z23 ENCOUNTER FOR IMMUNIZATION: Primary | ICD-10-CM

## 2021-03-03 PROCEDURE — 91300 SARS-COV-2 / COVID-19 MRNA VACCINE (PFIZER-BIONTECH) 30 MCG: CPT

## 2021-03-03 PROCEDURE — 0001A SARS-COV-2 / COVID-19 MRNA VACCINE (PFIZER-BIONTECH) 30 MCG: CPT

## 2021-03-24 ENCOUNTER — IMMUNIZATIONS (OUTPATIENT)
Dept: FAMILY MEDICINE CLINIC | Facility: HOSPITAL | Age: 76
End: 2021-03-24

## 2021-03-24 DIAGNOSIS — Z23 ENCOUNTER FOR IMMUNIZATION: Primary | ICD-10-CM

## 2021-03-24 PROCEDURE — 0002A SARS-COV-2 / COVID-19 MRNA VACCINE (PFIZER-BIONTECH) 30 MCG: CPT

## 2021-03-24 PROCEDURE — 91300 SARS-COV-2 / COVID-19 MRNA VACCINE (PFIZER-BIONTECH) 30 MCG: CPT

## 2021-11-23 ENCOUNTER — NEW PATIENT (OUTPATIENT)
Dept: URBAN - METROPOLITAN AREA CLINIC 6 | Facility: CLINIC | Age: 76
End: 2021-11-23

## 2021-11-23 DIAGNOSIS — Z96.1: ICD-10-CM

## 2021-11-23 DIAGNOSIS — H35.3131: ICD-10-CM

## 2021-11-23 DIAGNOSIS — H18.513: ICD-10-CM

## 2021-11-23 PROCEDURE — 92250 FUNDUS PHOTOGRAPHY W/I&R: CPT

## 2021-11-23 PROCEDURE — 92004 COMPRE OPH EXAM NEW PT 1/>: CPT

## 2021-11-23 PROCEDURE — 92015 DETERMINE REFRACTIVE STATE: CPT

## 2021-11-23 PROCEDURE — 2019F DILATED MACUL EXAM DONE: CPT

## 2021-11-23 PROCEDURE — G8427 DOCREV CUR MEDS BY ELIG CLIN: HCPCS

## 2021-11-23 PROCEDURE — 1036F TOBACCO NON-USER: CPT

## 2021-11-23 ASSESSMENT — VISUAL ACUITY
OD_CC: J5
OD_SC: 20/40
OD_CC: 20/60
OS_SC: 20/30
OD_PH: 20/50+1
OS_CC: J5
OS_CC: 20/40

## 2021-11-23 ASSESSMENT — TONOMETRY
OD_IOP_MMHG: 12
OS_IOP_MMHG: 12

## 2022-04-25 ENCOUNTER — APPOINTMENT (OUTPATIENT)
Dept: LAB | Facility: CLINIC | Age: 77
End: 2022-04-25
Payer: COMMERCIAL

## 2022-04-25 ENCOUNTER — TRANSCRIBE ORDERS (OUTPATIENT)
Dept: LAB | Facility: CLINIC | Age: 77
End: 2022-04-25

## 2022-04-25 DIAGNOSIS — I10 ESSENTIAL HYPERTENSION: ICD-10-CM

## 2022-04-25 DIAGNOSIS — E78.5 HYPERLIPIDEMIA, UNSPECIFIED HYPERLIPIDEMIA TYPE: Primary | ICD-10-CM

## 2022-04-25 DIAGNOSIS — E03.9 HYPOTHYROIDISM, ADULT: ICD-10-CM

## 2022-04-25 DIAGNOSIS — E78.5 HYPERLIPIDEMIA, UNSPECIFIED HYPERLIPIDEMIA TYPE: ICD-10-CM

## 2022-04-25 LAB
ABO GROUP BLD: NORMAL
ALBUMIN SERPL BCP-MCNC: 3.9 G/DL (ref 3.5–5)
ALP SERPL-CCNC: 55 U/L (ref 46–116)
ALT SERPL W P-5'-P-CCNC: 31 U/L (ref 12–78)
ANION GAP SERPL CALCULATED.3IONS-SCNC: 7 MMOL/L (ref 4–13)
AST SERPL W P-5'-P-CCNC: 28 U/L (ref 5–45)
BILIRUB SERPL-MCNC: 0.85 MG/DL (ref 0.2–1)
BUN SERPL-MCNC: 15 MG/DL (ref 5–25)
CALCIUM SERPL-MCNC: 9.5 MG/DL (ref 8.3–10.1)
CHLORIDE SERPL-SCNC: 106 MMOL/L (ref 100–108)
CHOLEST SERPL-MCNC: 237 MG/DL
CO2 SERPL-SCNC: 27 MMOL/L (ref 21–32)
CREAT SERPL-MCNC: 0.88 MG/DL (ref 0.6–1.3)
ERYTHROCYTE [DISTWIDTH] IN BLOOD BY AUTOMATED COUNT: 13.2 % (ref 11.6–15.1)
GFR SERPL CREATININE-BSD FRML MDRD: 64 ML/MIN/1.73SQ M
GLUCOSE P FAST SERPL-MCNC: 92 MG/DL (ref 65–99)
HCT VFR BLD AUTO: 43.9 % (ref 34.8–46.1)
HDLC SERPL-MCNC: 113 MG/DL
HGB BLD-MCNC: 14.3 G/DL (ref 11.5–15.4)
LDLC SERPL CALC-MCNC: 106 MG/DL (ref 0–100)
MCH RBC QN AUTO: 32.2 PG (ref 26.8–34.3)
MCHC RBC AUTO-ENTMCNC: 32.6 G/DL (ref 31.4–37.4)
MCV RBC AUTO: 99 FL (ref 82–98)
NONHDLC SERPL-MCNC: 124 MG/DL
PLATELET # BLD AUTO: 195 THOUSANDS/UL (ref 149–390)
PMV BLD AUTO: 9.9 FL (ref 8.9–12.7)
POTASSIUM SERPL-SCNC: 4.3 MMOL/L (ref 3.5–5.3)
PROT SERPL-MCNC: 7.2 G/DL (ref 6.4–8.2)
RBC # BLD AUTO: 4.44 MILLION/UL (ref 3.81–5.12)
RH BLD: POSITIVE
SODIUM SERPL-SCNC: 140 MMOL/L (ref 136–145)
TRIGL SERPL-MCNC: 92 MG/DL
TSH SERPL DL<=0.05 MIU/L-ACNC: 0.39 UIU/ML (ref 0.45–4.5)
WBC # BLD AUTO: 3.22 THOUSAND/UL (ref 4.31–10.16)

## 2022-04-25 PROCEDURE — 85027 COMPLETE CBC AUTOMATED: CPT

## 2022-04-25 PROCEDURE — 80053 COMPREHEN METABOLIC PANEL: CPT

## 2022-04-25 PROCEDURE — 86900 BLOOD TYPING SEROLOGIC ABO: CPT

## 2022-04-25 PROCEDURE — 36415 COLL VENOUS BLD VENIPUNCTURE: CPT

## 2022-04-25 PROCEDURE — 80061 LIPID PANEL: CPT

## 2022-04-25 PROCEDURE — 84443 ASSAY THYROID STIM HORMONE: CPT

## 2022-04-25 PROCEDURE — 86901 BLOOD TYPING SEROLOGIC RH(D): CPT

## 2022-12-12 ENCOUNTER — 1 YEAR FOLLOW-UP (OUTPATIENT)
Dept: URBAN - METROPOLITAN AREA CLINIC 6 | Facility: CLINIC | Age: 77
End: 2022-12-12

## 2022-12-12 DIAGNOSIS — Z96.1: ICD-10-CM

## 2022-12-12 DIAGNOSIS — H18.513: ICD-10-CM

## 2022-12-12 DIAGNOSIS — H35.3131: ICD-10-CM

## 2022-12-12 PROCEDURE — 92014 COMPRE OPH EXAM EST PT 1/>: CPT

## 2022-12-12 ASSESSMENT — VISUAL ACUITY
OS_CC: 20/25
OD_CC: 20/40

## 2022-12-12 ASSESSMENT — TONOMETRY
OD_IOP_MMHG: 12
OS_IOP_MMHG: 13

## 2023-02-02 ENCOUNTER — APPOINTMENT (OUTPATIENT)
Dept: LAB | Facility: CLINIC | Age: 78
End: 2023-02-02

## 2023-02-02 ENCOUNTER — TRANSCRIBE ORDERS (OUTPATIENT)
Dept: LAB | Facility: CLINIC | Age: 78
End: 2023-02-02

## 2023-02-02 DIAGNOSIS — E78.5 HYPERLIPIDEMIA, UNSPECIFIED HYPERLIPIDEMIA TYPE: Primary | ICD-10-CM

## 2023-02-02 DIAGNOSIS — I10 ESSENTIAL HYPERTENSION: ICD-10-CM

## 2023-02-02 DIAGNOSIS — R00.2 PALPITATIONS: ICD-10-CM

## 2023-02-02 DIAGNOSIS — E78.5 HYPERLIPIDEMIA, UNSPECIFIED HYPERLIPIDEMIA TYPE: ICD-10-CM

## 2023-02-02 DIAGNOSIS — E03.9 HYPOTHYROIDISM, ADULT: ICD-10-CM

## 2023-02-02 LAB
ALBUMIN SERPL BCP-MCNC: 4 G/DL (ref 3.5–5)
ALP SERPL-CCNC: 55 U/L (ref 46–116)
ALT SERPL W P-5'-P-CCNC: 23 U/L (ref 12–78)
ANION GAP SERPL CALCULATED.3IONS-SCNC: 8 MMOL/L (ref 4–13)
AST SERPL W P-5'-P-CCNC: 16 U/L (ref 5–45)
BILIRUB SERPL-MCNC: 0.63 MG/DL (ref 0.2–1)
BUN SERPL-MCNC: 7 MG/DL (ref 5–25)
CALCIUM SERPL-MCNC: 9.6 MG/DL (ref 8.3–10.1)
CHLORIDE SERPL-SCNC: 107 MMOL/L (ref 96–108)
CHOLEST SERPL-MCNC: 268 MG/DL
CO2 SERPL-SCNC: 27 MMOL/L (ref 21–32)
CREAT SERPL-MCNC: 0.78 MG/DL (ref 0.6–1.3)
ERYTHROCYTE [DISTWIDTH] IN BLOOD BY AUTOMATED COUNT: 11.6 % (ref 11.6–15.1)
GFR SERPL CREATININE-BSD FRML MDRD: 74 ML/MIN/1.73SQ M
GLUCOSE P FAST SERPL-MCNC: 91 MG/DL (ref 65–99)
HCT VFR BLD AUTO: 43.7 % (ref 34.8–46.1)
HDLC SERPL-MCNC: 58 MG/DL
HGB BLD-MCNC: 14.2 G/DL (ref 11.5–15.4)
LDLC SERPL CALC-MCNC: 187 MG/DL (ref 0–100)
MCH RBC QN AUTO: 32.5 PG (ref 26.8–34.3)
MCHC RBC AUTO-ENTMCNC: 32.5 G/DL (ref 31.4–37.4)
MCV RBC AUTO: 100 FL (ref 82–98)
NONHDLC SERPL-MCNC: 210 MG/DL
PLATELET # BLD AUTO: 253 THOUSANDS/UL (ref 149–390)
PMV BLD AUTO: 10.2 FL (ref 8.9–12.7)
POTASSIUM SERPL-SCNC: 3.7 MMOL/L (ref 3.5–5.3)
PROT SERPL-MCNC: 7.4 G/DL (ref 6.4–8.4)
RBC # BLD AUTO: 4.37 MILLION/UL (ref 3.81–5.12)
SODIUM SERPL-SCNC: 142 MMOL/L (ref 135–147)
T4 FREE SERPL-MCNC: 1.44 NG/DL (ref 0.76–1.46)
TRIGL SERPL-MCNC: 115 MG/DL
TSH SERPL DL<=0.05 MIU/L-ACNC: 0.31 UIU/ML (ref 0.45–4.5)
WBC # BLD AUTO: 3.89 THOUSAND/UL (ref 4.31–10.16)

## 2023-02-15 ENCOUNTER — HOSPITAL ENCOUNTER (OUTPATIENT)
Dept: NON INVASIVE DIAGNOSTICS | Facility: CLINIC | Age: 78
Discharge: HOME | End: 2023-02-15

## 2023-02-15 DIAGNOSIS — I49.1 PREMATURE ATRIAL CONTRACTIONS: ICD-10-CM

## 2023-03-29 NOTE — PROGRESS NOTES
Cardiology Clinic Note    Jake Cardona 70 y o  female   MRN: [id number]  Encounter: [id number]        Assessment / Plan:    # Irregular heart beats / heart racing  EKG --> from today shows sinus rhythm with sinus arrhythmia   Holter --> sinus rhythm  Occasional PACs (1 3%)  Rare PVCs (0 8%)  TSH was low with normal free T4  Possibly contributing  Recently lowered thyroid medication to address this  Check magnesium  Check echo to r/o structural heart disease  Could eventually consider beta blocker to see if helps with symptoms  May eventually want to check ziopatch to have longer duration monitor to r/o any afib  # HTN  On lisinopril 5mg QD in the past but recently self discontinued  BP today is elevated at 146/84 mmHg  Doing home monitoring with partner who is ER physician  BP has been acceptable at home off the lisinopril  Continue to monitor  # HLD  Lipid panel reviewed from 2/2/23:       The LDL is very elevated  ASCVD 29%  Statin would be recommended in this setting  The patient is hesitant to start statin, wants to check LP(a) first so we will order that and discuss again at future visits  # hypothyroidism  Recent TSH low but with normal free T4  Recently adjusted meds with PCP      Today's Plan Summary:  See above assessment/plan for full details of today's plan  Briefly,     Magnesium  LP(a)  echo          Reason For Visit / Chief Complaint:  Self referral - for palpitations with recent holter monitor results to review    HPI:   Jake Cardona is a 70 y o   female with history as noted in the problem list and further detailed in the above assessment and plan  Self referral - for palpitations with recent holter monitor results to review  The patient has a hx of HTN  The patient has HLD not currently on treatment  Was in usual state of health until late January of this year   At the gym fitbit said  and then suddenly 64 bpm   Then the HR fluctuated back and forth a bit  She had lightheadedness and had to sit down and ultimately leave the gym  Her partner (ER physician) checked the pulse when she got home and it was irreg irregular  Saw PCP next day  EKG showed sinus arrhthymia with PACs  holter was ordered  The holter monitor is now back -- showed sinus rhythm with 1 3% PACs (at times in atrial bigeminy pattern) and 0 8% PVC's  Recently stopped lisinopril for dizziness with possible low BP  Partner checking BP at home manually off the lisinopril  Today, she reports she feels well  Did have heart racing feeling in [place] but not since  Otherwise, no symptoms and not really having palpitations  Only knows about PACs from fitbit  No chest pain  No SOB or RAMOS  No edema  No orthopnea or PND  No syncope    Partner is ED physician  Patient is very active with golf and at the gym  Prior competitive  and prior into road biking  Eats very healthy vegetarian diet  Quit smoking 45 years ago  rare ETOH  1 cup coffee per day  Retired  Used to be a full professor  PhD in clinical social work  Cardiac Imaging personally reviewed:  EKG 3-30-23  Sinus rhythm with sinus arrhythmia  Nonspecific ST abnormality  Holter or event monitor 2-15-23  Sinus rhythm  Avg HR 77 bpm ()  Occasional PACs - 1 3%  often in pattern of bigeminy  5 atrial runs  Rare PVCs - 0 8%           Echo    ADELA    Cardiac MRI    Stress testing    Coronary CTA or [facility]    RHC    CPET              Patient Active Problem List    Diagnosis Date Noted   • Primary hypertension 03/30/2023   • Mixed hyperlipidemia 03/30/2023   • PAC (premature atrial contraction) 03/30/2023   • Hyperglycemia 04/25/2019   • Hypothyroidism 12/11/2018   • Vitamin D deficiency 12/11/2018   • Osteopenia 12/11/2018       Past Medical History:   Diagnosis Date   • Arthritis    • PTSD (post-traumatic stress disorder)        Review of Systems Constitutional: Negative for chills and fever  HENT: Negative for nosebleeds  Gastrointestinal: Negative for abdominal distention, abdominal pain and blood in stool  Neurological: Positive for dizziness  Negative for syncope  Psychiatric/Behavioral: Negative for confusion  14-point ROS completed and negative except as stated above and/or in the HPI  No Known Allergies    Current Outpatient Medications   Medication Instructions   • ALPRAZolam (XANAX) 0 5 mg tablet TAKE 1 TABLET EVERY 8 HOURS AS NEEDED   • levothyroxine 75 mcg, Oral, Daily   • Multiple Vitamin (THERAGRAN PO) Oral       Social History     Socioeconomic History   • Marital status:      Spouse name: Not on file   • Number of children: Not on file   • Years of education: Not on file   • Highest education level: Not on file   Occupational History   • Not on file   Tobacco Use   • Smoking status: Former     Types: Cigarettes     Quit date: 1977     Years since quittin 5   • Smokeless tobacco: Never   Substance and Sexual Activity   • Alcohol use: Yes     Comment: Social   • Drug use: No   • Sexual activity: Not Currently     Partners: Male   Other Topics Concern   • Not on file   Social History Narrative   • Not on file     Social Determinants of Health     Financial Resource Strain: Not on file   Food Insecurity: Not on file   Transportation Needs: Not on file   Physical Activity: Not on file   Stress: Not on file   Social Connections: Not on file   Intimate Partner Violence: Not on file   Housing Stability: Not on file       Family History   Problem Relation Age of Onset   • Heart disease Mother    • Stroke Mother    • Lung disease Father         black lung disease   • Heart attack Father         age 58       Physical Exam:  Blood pressure 146/84, pulse 80, weight 67 1 kg (148 lb)  Body mass index is 24 44 kg/m²    Wt Readings from Last 3 Encounters:   23 67 1 kg (148 lb)   19 60 6 kg (133 lb 9 6 oz)   19 66 2 kg (146 lb)     Physical Exam  Vitals reviewed  Constitutional:       General: She is not in acute distress  Appearance: She is not toxic-appearing  HENT:      Head: Normocephalic and atraumatic  Eyes:      General: No scleral icterus  Conjunctiva/sclera: Conjunctivae normal    Neck:      Vascular: No carotid bruit  Comments: No JVP elevation  Cardiovascular:      Rate and Rhythm: Normal rate and regular rhythm  Heart sounds: No murmur heard  No friction rub  No gallop  Comments: At times irregular beats appreciated  Pulmonary:      Breath sounds: Normal breath sounds  No wheezing, rhonchi or rales  Abdominal:      General: There is no distension  Palpations: Abdomen is soft  Tenderness: There is no abdominal tenderness  There is no guarding  Musculoskeletal:      Right lower leg: No edema  Left lower leg: No edema  Skin:     Coloration: Skin is not jaundiced or pale  Findings: No erythema  Neurological:      Mental Status: She is alert  Mental status is at baseline  Psychiatric:         Mood and Affect: Mood normal          Behavior: Behavior normal          Labs & Results:  Lab Results   Component Value Date    SODIUM 142 02/02/2023    K 3 7 02/02/2023     02/02/2023    CO2 27 02/02/2023    BUN 7 02/02/2023    CREATININE 0 78 02/02/2023    GLUC 83 08/14/2019    CALCIUM 9 6 02/02/2023       Thank you for the opportunity to participate in the care of this patient      Mary Clayton MD, [facility] [place]  Advanced Heart Failure and Transplant Cardiologist  Director of [facility]

## 2023-03-30 ENCOUNTER — OFFICE VISIT (OUTPATIENT)
Dept: CARDIOLOGY CLINIC | Facility: CLINIC | Age: 78
End: 2023-03-30

## 2023-03-30 ENCOUNTER — LAB (OUTPATIENT)
Dept: LAB | Facility: CLINIC | Age: 78
End: 2023-03-30

## 2023-03-30 VITALS
DIASTOLIC BLOOD PRESSURE: 84 MMHG | BODY MASS INDEX: 24.44 KG/M2 | WEIGHT: 148 LBS | SYSTOLIC BLOOD PRESSURE: 146 MMHG | HEART RATE: 80 BPM

## 2023-03-30 DIAGNOSIS — I49.1 PAC (PREMATURE ATRIAL CONTRACTION): ICD-10-CM

## 2023-03-30 DIAGNOSIS — I49.1 PAC (PREMATURE ATRIAL CONTRACTION): Primary | ICD-10-CM

## 2023-03-30 DIAGNOSIS — E78.2 MIXED HYPERLIPIDEMIA: ICD-10-CM

## 2023-03-30 DIAGNOSIS — I10 PRIMARY HYPERTENSION: ICD-10-CM

## 2023-03-30 LAB — MAGNESIUM SERPL-MCNC: 2 MG/DL (ref 1.6–2.6)

## 2023-03-30 RX ORDER — LEVOTHYROXINE SODIUM 0.07 MG/1
75 TABLET ORAL DAILY
COMMUNITY
Start: 2023-02-03

## 2023-03-30 NOTE — PATIENT INSTRUCTIONS
Get blood work to check magnesium and LP(a)    Get an echocardiogram     Then follow up with cardiology

## 2023-03-31 LAB — LPA SERPL-SCNC: 44.1 NMOL/L

## 2023-05-03 NOTE — PROGRESS NOTES
Cardiology Clinic Note    Tres Montalvo 70 y o  female   MRN: [id number]  Encounter: [id number]        Assessment / Plan:    # Irregular heart beats / heart racing  EKG --> from last visit showed sinus rhythm with sinus arrhythmia   Holter --> sinus rhythm  Occasional PACs (1 3%)  Rare PVCs (0 8%)  TSH was low with normal free T4  Possibly contributing  Recently lowered thyroid medication to address this  Echo -->  Normal LVEF  Sx's improved  Hold off on beta blocker at this time  Check ziopatch to have longer duration monitoring and r/o any occult afib    # HTN  On lisinopril 5mg QD in the past but recently self discontinued (now on vegetarian diet)  BP today is well controlled off medications  Doing home monitoring with partner who is ER physician  # HLD  Lipid panel reviewed from 2/2/23:       LP(a) -  44  The LDL is very elevated  ASCVD 18-29%  Statin would be recommended in this setting  Declines  Wants to meet with lipid expert which I am supportive of  # Aortic sclerosis without significant stenosis  Yearly exam   Echo as clinically indicated  # Mild mitral annular calcification with mild MR   Yearly exam   Echo as clinically indicated  # hypothyroidism  Recent TSH low but with normal free T4  Recently adjusted meds with PCP      Today's Plan Summary:  See above assessment/plan for full details of today's plan  Briefly,     Check brett  Wants to meet with lipid specialist to further discuss her LDL          Reason For Visit / Chief Complaint:  F/u palpitations    HPI:   Tres Montalvo is a 70 y o   female with history as noted in the problem list and further detailed in the above assessment and plan  Initial:  3/2023  Self referral - for palpitations with recent holter monitor results to review  The patient has a hx of HTN  The patient has HLD not currently on treatment  Was in usual state of health until late January of this year   At the [employer] said  and then suddenly 64 bpm   Then the HR fluctuated back and forth a bit  She had lightheadedness and had to sit down and ultimately leave the gym  Her partner (ER physician) checked the pulse when she got home and it was irreg irregular  Saw PCP next day  EKG showed sinus arrhthymia with PACs  holter was ordered  The holter monitor is now back -- showed sinus rhythm with 1 3% PACs (at times in atrial bigeminy pattern) and 0 8% PVC's  Recently stopped lisinopril for dizziness with possible low BP  Partner checking BP at home manually off the lisinopril  Today, she reports she feels well  Did have heart racing feeling in [place] but not since  Otherwise, no symptoms and not really having palpitations  Only knows about PACs from fitbit  No chest pain  No SOB or RAMOS  No edema  No orthopnea or PND  No syncope    Partner is ED physician  Patient is very active with golf and at the gym  Prior competitive  and prior into road biking  Eats very healthy vegetarian diet  Quit smoking 45 years ago  rare ETOH  1 cup coffee per day  Retired  Used to be a full professor  PhD in clinical social work  Interval:  At first visit -->  rec magnesium, LP(a), and echo    Mag - normal  LP(a) - 44    Echo -  EF 00%   Diastolic function abnormal relaxation  Normal RV size and function  Aortic sclerosis without significant stenosis  Mild mitral annular calcification with mild MR  Today - reports palpitations are better lately  Recently had 1 palpitation event at the gym (mostly identified by fitbit)  No syncope  No edema  No SOB or RAMOS  No chest pain  Occasional rare lightheadedness upon standing  Cardiac Imaging personally reviewed:  EKG 3-30-23  Sinus rhythm with sinus arrhythmia  Nonspecific ST abnormality  Holter or event monitor 2-15-23  Sinus rhythm  Avg HR 77 bpm ()  Occasional PACs - 1 3%  often in pattern of bigeminy    5 atrial runs  Rare PVCs - 0 8%  Echo Echo - 04/10/23  EF 84%   Diastolic function abnormal relaxation  Normal RV size and function  Aortic sclerosis without significant stenosis  Mild mitral annular calcification with mild MR        ADELA    Cardiac MRI    Stress testing    Coronary CTA or [facility]    RHC    CPET              Patient Active Problem List    Diagnosis Date Noted    Primary hypertension 2023    Mixed hyperlipidemia 2023    PAC (premature atrial contraction) 2023    Hyperglycemia 2019    Hypothyroidism 2018    Vitamin D deficiency 2018    Osteopenia 2018       Past Medical History:   Diagnosis Date    Arthritis     PTSD (post-traumatic stress disorder)        No Known Allergies    Current Outpatient Medications   Medication Instructions    ALPRAZolam (XANAX) 0 5 mg tablet TAKE 1 TABLET EVERY 8 HOURS AS NEEDED    levothyroxine 75 mcg, Oral, Daily    Multiple Vitamin (THERAGRAN PO) Oral       Social History     Socioeconomic History    Marital status:       Spouse name: Not on file    Number of children: Not on file    Years of education: Not on file    Highest education level: Not on file   Occupational History    Not on file   Tobacco Use    Smoking status: Former     Types: Cigarettes     Quit date: 1977     Years since quittin 6    Smokeless tobacco: Never   Substance and Sexual Activity    Alcohol use: Yes     Comment: Social    Drug use: No    Sexual activity: Not Currently     Partners: Male   Other Topics Concern    Not on file   Social History Narrative    Not on file     Social Determinants of Health     Financial Resource Strain: Not on file   Food Insecurity: Not on file   Transportation Needs: Not on file   Physical Activity: Not on file   Stress: Not on file   Social Connections: Not on file   Intimate Partner Violence: Not on file   Housing Stability: Not on file       Family History   Problem Relation Age of Onset    Heart disease Mother     Stroke Mother     Lung disease Father         black lung disease    Heart attack Father         age 58       Physical Exam:  Blood pressure 128/70, pulse 75, weight 67 7 kg (149 lb 4 8 oz), SpO2 98 %  Body mass index is 27 31 kg/m²  Wt Readings from Last 3 Encounters:   05/04/23 67 7 kg (149 lb 4 8 oz)   04/10/23 67 1 kg (148 lb)   03/30/23 67 1 kg (148 lb)     Physical Exam  Vitals reviewed  Constitutional:       General: She is not in acute distress  Appearance: She is not toxic-appearing  Neck:      Vascular: No carotid bruit  Comments: No JVP elevation  Cardiovascular:      Rate and Rhythm: Normal rate and regular rhythm  Heart sounds: No murmur heard  No friction rub  No gallop  Pulmonary:      Breath sounds: Normal breath sounds  No wheezing, rhonchi or rales  Abdominal:      General: There is no distension  Palpations: Abdomen is soft  Tenderness: There is no abdominal tenderness  There is no guarding  Musculoskeletal:      Right lower leg: No edema  Left lower leg: No edema  Neurological:      Mental Status: She is alert  Labs & Results:  Lab Results   Component Value Date    SODIUM 142 02/02/2023    K 3 7 02/02/2023     02/02/2023    CO2 27 02/02/2023    BUN 7 02/02/2023    CREATININE 0 78 02/02/2023    GLUC 83 08/14/2019    CALCIUM 9 6 02/02/2023       Thank you for the opportunity to participate in the care of this patient      [place], MD, [facility] [place]  Advanced Heart Failure and Transplant Cardiologist  Director of [facility]red[facility]

## 2023-05-04 ENCOUNTER — OFFICE VISIT (OUTPATIENT)
Dept: CARDIOLOGY CLINIC | Facility: CLINIC | Age: 78
End: 2023-05-04

## 2023-05-04 VITALS
DIASTOLIC BLOOD PRESSURE: 70 MMHG | OXYGEN SATURATION: 98 % | SYSTOLIC BLOOD PRESSURE: 128 MMHG | BODY MASS INDEX: 27.31 KG/M2 | HEART RATE: 75 BPM | WEIGHT: 149.3 LBS

## 2023-05-04 DIAGNOSIS — I10 PRIMARY HYPERTENSION: ICD-10-CM

## 2023-05-04 DIAGNOSIS — E78.2 MIXED HYPERLIPIDEMIA: ICD-10-CM

## 2023-05-04 DIAGNOSIS — I49.1 PAC (PREMATURE ATRIAL CONTRACTION): Primary | ICD-10-CM

## 2023-05-04 DIAGNOSIS — R00.2 PALPITATIONS: ICD-10-CM

## 2023-05-08 ENCOUNTER — TELEPHONE (OUTPATIENT)
Dept: CARDIOLOGY CLINIC | Facility: CLINIC | Age: 78
End: 2023-05-08

## 2023-05-08 NOTE — TELEPHONE ENCOUNTER
----- Message from Raffi Sanchez sent at 5/5/2023  2:29 PM EDT -----  Regarding: DIANAO  Per German Munoz is not required for both a 1W [id number] or a 2W [id number]  Per [facility] RTE registration, this patient has outpatient hospital benefits  This is a valid and billable code

## 2023-06-01 ENCOUNTER — CLINICAL SUPPORT (OUTPATIENT)
Dept: CARDIOLOGY CLINIC | Facility: CLINIC | Age: 78
End: 2023-06-01

## 2023-06-01 DIAGNOSIS — I49.1 PAC (PREMATURE ATRIAL CONTRACTION): ICD-10-CM

## 2023-06-01 DIAGNOSIS — R00.2 PALPITATIONS: ICD-10-CM

## 2023-06-01 NOTE — RESULT ENCOUNTER NOTE
Zio patch - Extended Holter Monitor Report  Analysis time (after artifact removed) - 13 days, 14 hours    Predominant underlying rhythm was Sinus Rhythm  Avg HR when in sinus rhythm was 77 bpm ()  Frequent PAC's (11 0%)  101 atrial runs  Fastest 7 beats (182 bpm)  Longest 21 seconds (avg rate 132 bpm)  Occasional PVC's (1 3%)  1 brief run of NSVT (4 beats)  No atrial fibrillation  Paient reported symptoms correlated with:  only 1 trigger in setting of a PVC (pt tells me she did not press the button at all so perhaps this was unintentional)  Reviewed all rhythm strips personally      Court Su MD

## 2023-06-02 ENCOUNTER — TELEPHONE (OUTPATIENT)
Dept: CARDIOLOGY CLINIC | Facility: CLINIC | Age: 78
End: 2023-06-02

## 2023-06-02 NOTE — TELEPHONE ENCOUNTER
Patient stopped by the office and dropped of a letter that contains questions about her test results  Please see the attachment

## 2023-07-06 ENCOUNTER — OFFICE VISIT (OUTPATIENT)
Dept: CARDIOLOGY CLINIC | Facility: CLINIC | Age: 78
End: 2023-07-06
Payer: COMMERCIAL

## 2023-07-06 VITALS
HEART RATE: 80 BPM | SYSTOLIC BLOOD PRESSURE: 150 MMHG | BODY MASS INDEX: 27.55 KG/M2 | DIASTOLIC BLOOD PRESSURE: 88 MMHG | WEIGHT: 150.6 LBS

## 2023-07-06 DIAGNOSIS — E78.5 DYSLIPIDEMIA: Primary | ICD-10-CM

## 2023-07-06 DIAGNOSIS — R00.2 PALPITATION: ICD-10-CM

## 2023-07-06 PROCEDURE — 99204 OFFICE O/P NEW MOD 45 MIN: CPT | Performed by: INTERNAL MEDICINE

## 2023-07-06 NOTE — PROGRESS NOTES
Cardiology Clinic Note    Vanessa Roldan 70 y.o. female   MRN: [id number]  Encounter: [id number]        Assessment / Plan:    # PAC's / atrial runs  EKG -->  sinus rhythm with sinus arrhythmia   Holter --> sinus rhythm. Occasional PACs (1.3%). Rare PVCs (0.8%). zio -->  11% PACs. 101 atrial runs (longest 21 sec). No definitive sustained afib. Echo -->  Normal EF  Rec stopping caffeine. Likely at a higher risk of developing afib. I would recommend at a minimum yearly ziopatch. Declines trial of beta blocker at this time. Pt (and partner who is medical doctor) would like to see EP. I will place referral.     # HTN  On lisinopril 5mg QD in the past but recently self discontinued (now on plant based diet). BP today is elevated. Recommend BB which could help with above and also help lower BP, declines for today. Doing home monitoring with partner who is ER physician. # HLD  Lipid panel 2/2/23:   . . LP(a) -  44  The LDL is very elevated. ASCVD 18-29%. Statin would be recommended in this setting. Patient declines. She saw a lipid specialist and statin also recommended at that time. Some testing was ordered but not completed (NMR and hsCRP). # Aortic sclerosis without significant stenosis  Yearly exam.  Echo as clinically indicated. # Mild mitral annular calcification with mild MR   Yearly exam.  Echo as clinically indicated. # hypothyroidism  Recent TSH low but with normal free T4  Recently adjusted meds with PCP      Today's Plan Summary:  See above assessment/plan for full details of today's plan. Briefly,     referral to EP          Reason For Visit / Chief Complaint:  F/u palpitations, HLD    HPI:   Vanessa Roldan is a 70 y.o.  female with history as noted in the problem list and further detailed in the above assessment and plan. Initial:  3/2023  Self referral - for palpitations with recent holter monitor results to review. The patient has a hx of HTN. The patient has HLD not currently on treatment. Was in usual state of health until late January of this year. At the gym fitbit said  and then suddenly 64 bpm.  Then the HR fluctuated back and forth a bit. She had lightheadedness and had to sit down and ultimately leave the gym. Her partner (ER physician) checked the pulse when she got home and it was irreg irregular. Saw PCP next day. EKG showed sinus arrhthymia with PACs. holter was ordered. The holter monitor is now back -- showed sinus rhythm with 1.3% PACs (at times in atrial bigeminy pattern) and 0.8% PVC's. Recently stopped lisinopril for dizziness with possible low BP. Partner checking BP at home manually off the lisinopril. Today, she reports she feels well. Did have heart racing feeling in [place] but not since. Otherwise, no symptoms and not really having palpitations. Only knows about PACs from fitbit. No chest pain. No SOB or RAMOS. No edema. No orthopnea or PND. No syncope. . Partner is ED physician. Patient is very active with golf and at the gym. Prior competitive  and prior into road biking. Eats very healthy vegetarian diet. Quit smoking 45 years ago. rare ETOH. 1 cup coffee per day. Retired. Used to be a full professor. PhD in clinical social work. Interval:  Last visit -> palpitations are better lately. Recently had 1 palpitation event at the gym (mostly identified by fitbit). No syncope. Last visit plan -->  Check zipatch  Wants to meet with lipid specialist to further discuss her LDL    Lipid specialist - Dr. Ignacio Munguia. Note reviewed. Statin recommended. Plan order NMR and hsCRP. Labs were never completed. Zio patch    Predominant underlying rhythm was Sinus Rhythm. Avg HR when in sinus rhythm was 77 bpm (). Frequent PAC's (11.0%). 101 atrial runs.  Fastest 7 beats (182 bpm). Longest 21 seconds (avg rate 132 bpm). Occasional PVC's (1.3%).   1 brief run of NSVT (4 beats)  No definitive atrial fibrillation. Cale reported symptoms correlated with:  only 1 trigger in setting of a PVC (pt tells me she did not press the button at all so perhaps this was unintentional)    Today - comes in with significant other (who is a medical doctor). The patient had a one minute episode on golf course - dizziness / lightheadedness / SOB / loss of power. Pt notes it was hot that day and felt better getting in air conditioning. Concern is that this was an atrial arrhythmia. No syncope. No edema. No orthopnea or PND. No chest pain. Cardiac Imaging personally reviewed:  EKG 3-30-23  Sinus rhythm with sinus arrhythmia. Nonspecific ST abnormality. Holter or event monitor 2-15-23  Sinus rhythm. Avg HR 77 bpm (). Occasional PACs - 1.3%. often in pattern of bigeminy. 5 atrial runs. Rare PVCs - 0.8%. Zio patch    Predominant underlying rhythm was Sinus Rhythm. Avg HR when in sinus rhythm was 77 bpm (). Frequent PAC's (11.0%). 101 atrial runs.  Fastest 7 beats (182 bpm). Longest 21 seconds (avg rate 132 bpm). Occasional PVC's (1.3%). 1 brief run of NSVT (4 beats)  No atrial fibrillation. Cale reported symptoms correlated with:  only 1 trigger in setting of a PVC (pt tells me she did not press the button at all so perhaps this was unintentional)     Echo Echo - 04/10/23  EF 60%.  Diastolic function abnormal relaxation. Normal RV size and function. Aortic sclerosis without significant stenosis.   Mild mitral annular calcification with mild MR.       ADELA    Cardiac MRI    Stress testing    [facility]    CPET              Patient Active Problem List    Diagnosis Date Noted   • Primary hypertension 03/30/2023   • Mixed hyperlipidemia 03/30/2023   • PAC (premature atrial contraction) 03/30/2023   • Hyperglycemia 04/25/2019   • Hypothyroidism 12/11/2018   • Vitamin D deficiency 12/11/2018   • Osteopenia 12/11/2018       Past Medical History:   Diagnosis Date   • Arthritis    • PTSD (post-traumatic stress disorder)        No Known Allergies    Current Outpatient Medications   Medication Instructions   • ALPRAZolam (XANAX) 0.5 mg tablet TAKE 1 TABLET EVERY 8 HOURS AS NEEDED   • levothyroxine 75 mcg, Oral, Daily   • Multiple Vitamin (THERAGRAN PO) Oral       Social History     Socioeconomic History   • Marital status:      Spouse name: Not on file   • Number of children: Not on file   • Years of education: Not on file   • Highest education level: Not on file   Occupational History   • Not on file   Tobacco Use   • Smoking status: Former     Types: Cigarettes     Quit date: 1977     Years since quittin.8   • Smokeless tobacco: Never   Substance and Sexual Activity   • Alcohol use: Yes     Comment: Social   • Drug use: No   • Sexual activity: Not Currently     Partners: Male   Other Topics Concern   • Not on file   Social History Narrative   • Not on file     Social Determinants of Health     Financial Resource Strain: Not on file   Food Insecurity: Not on file   Transportation Needs: Not on file   Physical Activity: Not on file   Stress: Not on file   Social Connections: Not on file   Intimate Partner Violence: Not on file   Housing Stability: Not on file       Family History   Problem Relation Age of Onset   • Heart disease Mother    • Stroke Mother    • Lung disease Father         black lung disease   • Heart attack Father         age 58       Physical Exam:  Blood pressure 150/78, pulse 76, weight 66.7 kg (147 lb), SpO2 97 %. Body mass index is 26.89 kg/m². Wt Readings from Last 3 Encounters:   07/10/23 66.7 kg (147 lb)   23 68.3 kg (150 lb 9.6 oz)   23 67.7 kg (149 lb 4.8 oz)     Physical Exam  Vitals reviewed. Constitutional:       General: She is not in acute distress. Appearance: She is not toxic-appearing.    Neck:      Comments: JVP not elevated  Cardiovascular:      Rate and Rhythm: Normal rate and regular rhythm. Heart sounds: No murmur heard. No friction rub. No gallop. Pulmonary:      Breath sounds: Normal breath sounds. No wheezing, rhonchi or rales. Musculoskeletal:      Right lower leg: No edema. Left lower leg: No edema. Neurological:      Mental Status: She is alert. Labs & Results:  Lab Results   Component Value Date    SODIUM 142 02/02/2023    K 3.7 02/02/2023     02/02/2023    CO2 27 02/02/2023    BUN 7 02/02/2023    CREATININE 0.78 02/02/2023    GLUC 83 08/14/2019    CALCIUM 9.6 02/02/2023       Thank you for the opportunity to participate in the care of this patient.     Kendrick Quick MD, [facility]  Advanced Heart Failure and Transplant Cardiologist  Director of [address] [place] [address]

## 2023-07-06 NOTE — PROGRESS NOTES
Cardiology             Loida Huerta  1945  [id number]          Lipid clinic consultation        Assessment/Plan:    Dyslipidemia: 10-year risk of ASCVD is elevated at 23%. Had a very long discussion with the patient and her partner was a medical background about the implications of elevated LDL cholesterol, and more importantly, her elevated 10-year risk of ASCVD. She would like to avoid statin therapy if able and is requesting an NMR panel as well as a hs-CRP for further evaluation and restratification. I will order the studies for her. Her partner seems to think that "statins are a con" as he has been listening to Dr. Rina Peng, through the internet and youtube. I have told Maya Kennedy that she would likely benefit with statin therapy even if her small dense LDL particle size is within normal limits, considering her elevated ASCVD risk score, family history of ASCVD and moderate to severely elevated LDL cholesterol at 187 mg/dL on prior lipid panel 2/2023. I explained to her that an NMR would likely not change my management recommendations, but her partner was very demanding on what he wanted me to order in the blood work. I agreed to order the hsCRP and NMR. He disrespectfully demanded I order a normal CRP incase the hsCRP was elevated and I refused, recommending she should follow up with her PCP if she wanted to look for noncardiac causes of inflammation incase her hsCRP Is elevated. She will follow up with me over the phone after completion of her blood testing to discuss results over the phone. Follow up with primary cardiologist        Interval History: This is a 51-year-old female who follows with Dr. Annie Murcia for history of palpitations relating to PACs. She also has hypertension, dyslipidemia, aortic sclerosis, and mild mitral regurgitation as well as a history of hypothyroidism.     Her last lipid panel 2/2/2023 revealed LDL cholesterol 187 mg/dL with normal LP(a) at 44.  Continue risk of ASCVD was calculated up to 23%. Statin therapy was recommended by her primary cardiologist but she declined. She presents today for a consultation to lipid clinic. She presents with her partner who is a medical background, possibly retired surgeon. They both request an NMR profile to look at the lipid particle breakdown numbers. From a symptomatic standpoint she feels well without any exertional symptoms. She admits to family history of CABG in her father. She is a non-smoker and is vegetarian. She tries to eat a healthy diet and exercise regularly. Vitals:  Vitals:    23 0957   BP: 150/88   BP Location: Left arm   Patient Position: Sitting   Cuff Size: Adult   Pulse: 80   Weight: 68.3 kg (150 lb 9.6 oz)         Past Medical History:   Diagnosis Date   • Arthritis    • PTSD (post-traumatic stress disorder)      Social History     Socioeconomic History   • Marital status:       Spouse name: Not on file   • Number of children: Not on file   • Years of education: Not on file   • Highest education level: Not on file   Occupational History   • Not on file   Tobacco Use   • Smoking status: Former     Types: Cigarettes     Quit date: 1977     Years since quittin.8   • Smokeless tobacco: Never   Substance and Sexual Activity   • Alcohol use: Yes     Comment: Social   • Drug use: No   • Sexual activity: Not Currently     Partners: Male   Other Topics Concern   • Not on file   Social History Narrative   • Not on file     Social Determinants of Health     Financial Resource Strain: Not on file   Food Insecurity: Not on file   Transportation Needs: Not on file   Physical Activity: Not on file   Stress: Not on file   Social Connections: Not on file   Intimate Partner Violence: Not on file   Housing Stability: Not on file      Family History   Problem Relation Age of Onset   • Heart disease Mother    • Stroke Mother    • Lung disease Father         black lung disease   • Heart attack Father         age 58     Past Surgical History:   Procedure Laterality Date   • BUNIONECTOMY Right    • KNEE ARTHROSCOPY W/ MENISCAL REPAIR Right 2009   • REPLACEMENT TOTAL KNEE Right 2017       Current Outpatient Medications:   •  ALPRAZolam (XANAX) 0.5 mg tablet, TAKE 1 TABLET EVERY 8 HOURS AS NEEDED, Disp: , Rfl: 5  •  levothyroxine 75 mcg tablet, Take 75 mcg by mouth daily, Disp: , Rfl:   •  Multiple Vitamin (THERAGRAN PO), Take by mouth, Disp: , Rfl:         Review of Systems:  Review of Systems   Constitutional: Negative for activity change, fever and unexpected weight change. HENT: Negative for facial swelling, nosebleeds and voice change. Respiratory: Negative for chest tightness, shortness of breath and wheezing. Cardiovascular: Negative for chest pain, palpitations and leg swelling. Gastrointestinal: Negative for abdominal distention. Genitourinary: Negative for hematuria. Musculoskeletal: Negative for arthralgias. Skin: Negative for color change, pallor, rash and wound. Neurological: Negative for dizziness, seizures and syncope. Psychiatric/Behavioral: Negative for agitation. Physical Exam:  Physical Exam  Vitals reviewed. Constitutional:       Appearance: She is well-developed. HENT:      Head: Normocephalic and atraumatic. Cardiovascular:      Rate and Rhythm: Normal rate and regular rhythm. Heart sounds: Normal heart sounds. Pulmonary:      Effort: Pulmonary effort is normal.      Breath sounds: Normal breath sounds. Abdominal:      Palpations: Abdomen is soft. Musculoskeletal:         General: Normal range of motion. Cervical back: Normal range of motion and neck supple. Skin:     General: Skin is warm and dry. Neurological:      Mental Status: She is alert and oriented to person, place, and time. Psychiatric:         Behavior: Behavior normal.         Thought Content:  Thought content normal.         Judgment: Judgment normal.         This note was completed in part utilizing M-Modal Fluency Direct Software. Grammatical errors, random word insertions, spelling mistakes, and incomplete sentences can be an occasional consequence of this system secondary to software limitations, ambient noise, and hardware issues. If you have any questions or concerns about the content, text, or information contained within the body of this dictation, please contact the provider for clarification.

## 2023-07-10 ENCOUNTER — OFFICE VISIT (OUTPATIENT)
Dept: CARDIOLOGY CLINIC | Facility: CLINIC | Age: 78
End: 2023-07-10
Payer: COMMERCIAL

## 2023-07-10 VITALS
HEART RATE: 76 BPM | BODY MASS INDEX: 26.89 KG/M2 | OXYGEN SATURATION: 97 % | WEIGHT: 147 LBS | SYSTOLIC BLOOD PRESSURE: 150 MMHG | DIASTOLIC BLOOD PRESSURE: 78 MMHG

## 2023-07-10 DIAGNOSIS — I10 PRIMARY HYPERTENSION: ICD-10-CM

## 2023-07-10 DIAGNOSIS — E78.5 DYSLIPIDEMIA: ICD-10-CM

## 2023-07-10 DIAGNOSIS — I49.1 PAC (PREMATURE ATRIAL CONTRACTION): Primary | ICD-10-CM

## 2023-07-10 PROCEDURE — 99214 OFFICE O/P EST MOD 30 MIN: CPT | Performed by: INTERNAL MEDICINE

## 2023-07-20 ENCOUNTER — CONSULT (OUTPATIENT)
Dept: CARDIOLOGY CLINIC | Facility: CLINIC | Age: 78
End: 2023-07-20
Payer: COMMERCIAL

## 2023-07-20 ENCOUNTER — TELEPHONE (OUTPATIENT)
Dept: CARDIOLOGY CLINIC | Facility: CLINIC | Age: 78
End: 2023-07-20

## 2023-07-20 VITALS
WEIGHT: 148.2 LBS | SYSTOLIC BLOOD PRESSURE: 140 MMHG | DIASTOLIC BLOOD PRESSURE: 82 MMHG | BODY MASS INDEX: 27.11 KG/M2 | HEART RATE: 70 BPM

## 2023-07-20 DIAGNOSIS — I49.1 PAC (PREMATURE ATRIAL CONTRACTION): ICD-10-CM

## 2023-07-20 PROCEDURE — 99214 OFFICE O/P EST MOD 30 MIN: CPT | Performed by: INTERNAL MEDICINE

## 2023-07-20 PROCEDURE — 93000 ELECTROCARDIOGRAM COMPLETE: CPT | Performed by: INTERNAL MEDICINE

## 2023-07-20 RX ORDER — DILTIAZEM HYDROCHLORIDE 120 MG/1
120 CAPSULE, EXTENDED RELEASE ORAL DAILY
Qty: 30 CAPSULE | Refills: 3 | Status: SHIPPED | OUTPATIENT
Start: 2023-07-20

## 2023-07-20 RX ORDER — LEVOTHYROXINE SODIUM 0.05 MG/1
50 TABLET ORAL DAILY
COMMUNITY
Start: 2023-07-10

## 2023-07-20 NOTE — PATIENT INSTRUCTIONS
Obtain 2 week monitor    Start diltiazem 120 mg daily 3-4 days after placing monitor    Obtain thyroid blood work

## 2023-07-20 NOTE — TELEPHONE ENCOUNTER
Antonio Lombardi   Did you call pt, she left a message wasn't able to get to the phone, she said she thinks it was Dr Mehul Skelton calling, but I guessing not.   Let me know thx

## 2023-07-20 NOTE — PROGRESS NOTES
EPS Consultation/New Patient Evaluation - Jose Humphries 70 y.o. female MRN: [id number]       Referring: Dr. Heath Shown    CC/HPI:   It was a pleasure to see Jose Humphries in our arrhythmia clinic at [facility]. As you know she is a 70 y.o. woman with arthritis, PTSD who presents to discuss management of PVCs. Patient had Holter monitor which revealed occasional PACs with 1.3% burden and rare PVCs. 2-week cardiac event monitor was performed which revealed 11% PAC burden. She also had short runs of atrial tachycardia. Echocardiogram was normal and she was recommended to stop caffeine. She declined trial of beta-blocker and preferred to discuss further management with electrophysiology. She presents with her friend who is an ER physician and has training in surgery. Patient reports having palpitations and at times dizziness. When she does not feel palpitation all the time however only when her heart is racing. She has noted heart racing up to 120 bpm.  She has cut down on her caffeine intake and consumes a vegan diet. She will also decrease her alcohol intake as well. She denies any swelling in lower extremity, orthopnea, PND or syncope.     Past Medical History:  Past Medical History:   Diagnosis Date   • Arthritis    • PTSD (post-traumatic stress disorder)        Medications:      Current Outpatient Medications:   •  ALPRAZolam (XANAX) 0.5 mg tablet, TAKE 1 TABLET EVERY 8 HOURS AS NEEDED, Disp: , Rfl: 5  •  diltiazem (DILACOR XR) 120 MG 24 hr capsule, Take 1 capsule (120 mg total) by mouth daily, Disp: 30 capsule, Rfl: 3  •  Multiple Vitamin (THERAGRAN PO), Take by mouth, Disp: , Rfl:   •  levothyroxine 50 mcg tablet, Take 50 mcg by mouth daily, Disp: , Rfl:      Family History   Problem Relation Age of Onset   • Heart disease Mother    • Stroke Mother    • Lung disease Father         black lung disease   • Heart attack Father         age 58     Social History Socioeconomic History   • Marital status:      Spouse name: Not on file   • Number of children: Not on file   • Years of education: Not on file   • Highest education level: Not on file   Occupational History   • Not on file   Tobacco Use   • Smoking status: Former     Types: Cigarettes     Quit date: 1977     Years since quittin.9   • Smokeless tobacco: Never   Substance and Sexual Activity   • Alcohol use: Yes     Comment: Social   • Drug use: No   • Sexual activity: Not Currently     Partners: Male   Other Topics Concern   • Not on file   Social History Narrative   • Not on file     Social Determinants of Health     Financial Resource Strain: Not on file   Food Insecurity: Not on file   Transportation Needs: Not on file   Physical Activity: Not on file   Stress: Not on file   Social Connections: Not on file   Intimate Partner Violence: Not on file   Housing Stability: Not on file     Social History     Tobacco Use   Smoking Status Former   • Types: Cigarettes   • Quit date: 1977   • Years since quittin.9   Smokeless Tobacco Never     Social History     Substance and Sexual Activity   Alcohol Use Yes    Comment: Social       Review of Systems   Constitutional: Negative for chills and fever. HENT: Negative. Eyes: Negative for blurred vision and double vision. Cardiovascular: Positive for palpitations. Negative for chest pain, dyspnea on exertion, leg swelling, near-syncope, orthopnea, paroxysmal nocturnal dyspnea and syncope. Respiratory: Negative for cough and sputum production. Endocrine: Negative. Skin: Negative. Negative for rash. Musculoskeletal: Negative. Negative for arthritis and joint pain. Gastrointestinal: Negative for abdominal pain, nausea and vomiting. Genitourinary: Negative. Neurological: Positive for dizziness. Negative for light-headedness. Psychiatric/Behavioral: Negative. The patient is not nervous/anxious.          Objective:     Vitals: Blood pressure 140/82, pulse 70, weight 67.2 kg (148 lb 3.2 oz). , Body mass index is 27.11 kg/m². ,        Physical Exam:    GEN: Gracia Garcia appears well, alert and oriented x 3, pleasant and cooperative   HEENT: pupils equal, round, and reactive to light; extraocular muscles intact  NECK: supple, no carotid bruits   HEART: regular rhythm, normal S1 and S2, no murmurs, clicks, gallops or rubs   LUNGS: clear to auscultation bilaterally; no wheezes, rales, or rhonchi   ABDOMEN: normal bowel sounds, soft, no tenderness, no distention  EXTREMITIES: peripheral pulses normal; no clubbing, cyanosis, or edema  NEURO: no focal findings   SKIN: normal without suspicious lesions on exposed skin      Labs & Results:  Below is the patient's most recent value for Albumin, ALT, AST, BUN, Calcium, Chloride, Cholesterol, CO2, Creatinine, GFR, Glucose, HDL, Hematocrit, Hemoglobin, Hemoglobin A1C, LDL, Magnesium, Phosphorus, Platelets, Potassium, PSA, Sodium, Triglycerides, and WBC. Lab Results   Component Value Date    ALT 23 02/02/2023    AST 16 02/02/2023    BUN 7 02/02/2023    CALCIUM 9.6 02/02/2023     02/02/2023    CO2 27 02/02/2023    CREATININE 0.78 02/02/2023    HDL 58 02/02/2023    HCT 43.7 02/02/2023    HGB 14.2 02/02/2023    HGBA1C 5.2 05/31/2019    MG 2.0 03/30/2023     02/02/2023    K 3.7 02/02/2023    TRIG 115 02/02/2023    WBC 3.89 (L) 02/02/2023     Note: for a comprehensive list of the patient's lab results, access the Results Review activity. Cardiac testing:     I personally reviewed the ECG performed in the clinic on 07/20/23. It reveals normal sinus rhythm with atrial bigeminy. Echocardiograms:  No results found for this or any previous visit. No results found for this or any previous visit. Catheterizations:   No results found for this or any previous visit. Stress Tests:  No results found for this or any previous visit.       Holter monitor -   No results found for this or any previous visit. No results found for this or any previous visit. ASSESSMENT/PLAN:      1. PACs  Recent cardiac event monitor revealed 11% burden  Beta-blocker was offered but patient declined  Currently EKG shows atrial bigeminy  She occasionally feels palpitations and dizziness  We discussed neck steps in management including AV marla blocking agents versus antiarrhythmic therapy  We will start her on diltiazem 120 mg daily  Obtain 2-week cardiac event monitor  Patient will start diltiazem 3 to 4 days into the cardiac event monitor and we will assess response to the medication  Emphasized that atrial bigeminy is benign and should not lead to atrial fibrillation unless she has other risk factors including alcohol, caffeine, infection, sleep apnea    2. Hypertension  Borderline normotensive in the office  Patient was offered lisinopril in the past but she self discontinued  She will check her BP at home on daily basis and keep a log of it    3.   Hyperlipidemia  Elevated LDL levels  Further management per primary cardiologist

## 2023-07-28 ENCOUNTER — CLINICAL SUPPORT (OUTPATIENT)
Dept: CARDIOLOGY CLINIC | Facility: CLINIC | Age: 78
End: 2023-07-28
Payer: COMMERCIAL

## 2023-07-28 DIAGNOSIS — I49.1 PAC (PREMATURE ATRIAL CONTRACTION): ICD-10-CM

## 2023-07-28 PROCEDURE — 93246 EXT ECG>7D<15D RECORDING: CPT | Performed by: INTERNAL MEDICINE

## 2023-07-31 ENCOUNTER — TELEPHONE (OUTPATIENT)
Dept: CARDIOLOGY CLINIC | Facility: CLINIC | Age: 78
End: 2023-07-31

## 2023-07-31 ENCOUNTER — TRANSCRIBE ORDERS (OUTPATIENT)
Dept: LAB | Facility: CLINIC | Age: 78
End: 2023-07-31

## 2023-07-31 ENCOUNTER — APPOINTMENT (OUTPATIENT)
Dept: LAB | Facility: CLINIC | Age: 78
End: 2023-07-31
Payer: COMMERCIAL

## 2023-07-31 DIAGNOSIS — I47.1 MULTIFOCAL ATRIAL TACHYCARDIA (HCC): ICD-10-CM

## 2023-07-31 DIAGNOSIS — I47.1 MULTIFOCAL ATRIAL TACHYCARDIA (HCC): Primary | ICD-10-CM

## 2023-07-31 LAB — CRP SERPL QL: <3 MG/L

## 2023-07-31 PROCEDURE — 86140 C-REACTIVE PROTEIN: CPT

## 2023-07-31 PROCEDURE — 36415 COLL VENOUS BLD VENIPUNCTURE: CPT

## 2023-07-31 NOTE — TELEPHONE ENCOUNTER
This is a message for Doctor Keesha Ibarra  Nurse  This is Nolberto Woods, [phone]. I have a question about the heart monitor and the medication Diltiazem. [phone]. Thank you.

## 2023-08-01 ENCOUNTER — TELEPHONE (OUTPATIENT)
Dept: CARDIOLOGY CLINIC | Facility: CLINIC | Age: 78
End: 2023-08-01

## 2023-08-01 NOTE — TELEPHONE ENCOUNTER
This is Anil Sigala, [phone]. I called your line yesterday. Got no response. I walked in with a written message for Dr Chacho Solomon with all of my questions about my heart medication. I got no response. Please read the note that I wrote to him yesterday. Please have him call me with answers to my questions. These are serious questions about my cardiac condition and I've been waiting 2 days and can't even get a response. Jordan Dawn, [phone].

## 2023-08-01 NOTE — TELEPHONE ENCOUNTER
Patient dropped letter off for Dr. Tone England asking if she could hold off on starting diltiazem since she stopped all alcohol and caffeine and Synthroid dose was lowered to 50mcg, also eating vegetarian/vegan diet. Dr. Tone England said that she could finish out wearing her current Zio patch and we will assess results with the changes she has made before deciding on starting diltiazem and the need for another monitor. Patient was instructed on this and we can discuss when results of Zio are back. She is currently on day 6.

## 2023-08-11 DIAGNOSIS — I49.1 PAC (PREMATURE ATRIAL CONTRACTION): ICD-10-CM

## 2023-08-11 RX ORDER — DILTIAZEM HYDROCHLORIDE 120 MG/1
120 CAPSULE, EXTENDED RELEASE ORAL DAILY
Qty: 90 CAPSULE | Refills: 2 | Status: SHIPPED | OUTPATIENT
Start: 2023-08-11

## 2023-08-16 ENCOUNTER — CLINICAL SUPPORT (OUTPATIENT)
Dept: CARDIOLOGY CLINIC | Facility: CLINIC | Age: 78
End: 2023-08-16
Payer: COMMERCIAL

## 2023-08-16 DIAGNOSIS — I49.1 PAC (PREMATURE ATRIAL CONTRACTION): ICD-10-CM

## 2023-08-16 PROCEDURE — 93248 EXT ECG>7D<15D REV&INTERPJ: CPT | Performed by: INTERNAL MEDICINE

## 2023-08-21 ENCOUNTER — TELEPHONE (OUTPATIENT)
Dept: ENDOCRINOLOGY | Facility: CLINIC | Age: 78
End: 2023-08-21

## 2023-08-21 NOTE — TELEPHONE ENCOUNTER
Dr. Gerardo Alvares stating he spoke with Dr. Jamie Holley today about getting this patient seen this week. Per patient, Dr. Jamie Holley agreed to get her in however I do not see any spot available. Where should I schedule her?

## 2023-08-22 ENCOUNTER — CONSULT (OUTPATIENT)
Dept: ENDOCRINOLOGY | Facility: CLINIC | Age: 78
End: 2023-08-22
Payer: COMMERCIAL

## 2023-08-22 VITALS
RESPIRATION RATE: 18 BRPM | HEIGHT: 62 IN | SYSTOLIC BLOOD PRESSURE: 146 MMHG | DIASTOLIC BLOOD PRESSURE: 74 MMHG | TEMPERATURE: 96.3 F | HEART RATE: 45 BPM | BODY MASS INDEX: 27.6 KG/M2 | OXYGEN SATURATION: 98 % | WEIGHT: 150 LBS

## 2023-08-22 DIAGNOSIS — E03.9 HYPOTHYROIDISM, UNSPECIFIED TYPE: Primary | ICD-10-CM

## 2023-08-22 DIAGNOSIS — R53.83 OTHER FATIGUE: ICD-10-CM

## 2023-08-22 DIAGNOSIS — M85.80 OSTEOPENIA, UNSPECIFIED LOCATION: ICD-10-CM

## 2023-08-22 DIAGNOSIS — I47.1 PAROXYSMAL ATRIAL TACHYCARDIA (HCC): ICD-10-CM

## 2023-08-22 DIAGNOSIS — E55.9 VITAMIN D DEFICIENCY: ICD-10-CM

## 2023-08-22 DIAGNOSIS — E78.2 MIXED HYPERLIPIDEMIA: ICD-10-CM

## 2023-08-22 PROCEDURE — 99204 OFFICE O/P NEW MOD 45 MIN: CPT | Performed by: INTERNAL MEDICINE

## 2023-08-22 NOTE — TELEPHONE ENCOUNTER
Rosalee Campa,     Dr. Hernandez Hutchinson spoke to me about this patient and wanted to see her in the next 2 weeks. I'll be keeping an eye on the schedule to see if anything opens up.      Thank you

## 2023-08-22 NOTE — PROGRESS NOTES
New consult Note      CC: Hypothyroidism    History of Present Illness:   74yr-year-old female with HTN, osteopenia, vitamin D deficiency, HLD, hypothyroidism since age 50 and recently diagnosed paroxysmal atrial tachycardia. She was diagnosed with hypothyroidism roughly age 50 at which time, she was placed on Tirosint and Cytomel. In 2018, after moving to [facility], she was seen by Dr. Linn Smith and transition to levothyroxine 75 mcg and then 88 mcg respectively. More recently, her levothyroxine dose was reduced to 50 mcg daily. 2019 DEXA: T-scores 1.0 LS, -1.4 LTH, -1.5 LFN. 10-year FRAX score was 1.6% hip and 10% major osteoporotic fracture.     Patient Active Problem List   Diagnosis   • Hypothyroidism   • Vitamin D deficiency   • Osteopenia   • Hyperglycemia   • Primary hypertension   • Mixed hyperlipidemia   • PAC (premature atrial contraction)     Past Medical History:   Diagnosis Date   • Arthritis    • PTSD (post-traumatic stress disorder)       Past Surgical History:   Procedure Laterality Date   • BUNIONECTOMY Right    • KNEE ARTHROSCOPY W/ MENISCAL REPAIR Right    • REPLACEMENT TOTAL KNEE Right 2017      Family History   Problem Relation Age of Onset   • Heart disease Mother    • Stroke Mother    • Lung disease Father         black lung disease   • Heart attack Father         age 58     Social History     Tobacco Use   • Smoking status: Former     Types: Cigarettes     Quit date: 1977     Years since quittin.9   • Smokeless tobacco: Never   Substance Use Topics   • Alcohol use: Not Currently     No Known Allergies      Current Outpatient Medications:   •  ALPRAZolam (XANAX) 0.5 mg tablet, TAKE 1 TABLET EVERY 8 HOURS AS NEEDED, Disp: , Rfl: 5  •  levothyroxine 50 mcg tablet, Take 50 mcg by mouth daily, Disp: , Rfl:   •  Multiple Vitamin (THERAGRAN PO), Take by mouth, Disp: , Rfl:   •  diltiazem (DILACOR XR) 120 MG 24 hr capsule, TAKE 1 CAPSULE BY MOUTH EVERY DAY (Patient not taking: Reported on 8/22/2023), Disp: 90 capsule, Rfl: 2    Review of Systems   HENT: Negative. Eyes: Negative. Respiratory: Negative. Cardiovascular: Negative. Gastrointestinal: Negative. Endocrine: Negative. Musculoskeletal: Negative. Skin: Negative. Allergic/Immunologic: Negative. Neurological: Negative. Hematological: Negative. Psychiatric/Behavioral: Negative. Physical Exam:  Body mass index is 27.44 kg/m². /74   Pulse (!) 45   Temp (!) 96.3 °F (35.7 °C) (Tympanic)   Resp 18   Ht 5' 2" (1.575 m)   Wt 68 kg (150 lb)   SpO2 98%   BMI 27.44 kg/m²    Vitals:    08/22/23 1446   Weight: 68 kg (150 lb)        Physical Exam  Constitutional:       General: She is not in acute distress. Appearance: She is well-developed. She is not ill-appearing. HENT:      Head: Normocephalic and atraumatic. Nose: Nose normal.      Mouth/Throat:      Pharynx: Oropharynx is clear. Eyes:      Extraocular Movements: Extraocular movements intact. Conjunctiva/sclera: Conjunctivae normal.   Neck:      Thyroid: No thyromegaly. Cardiovascular:      Rate and Rhythm: Normal rate. Pulmonary:      Effort: Pulmonary effort is normal.   Musculoskeletal:         General: No deformity. Cervical back: Normal range of motion. Skin:     Capillary Refill: Capillary refill takes less than 2 seconds. Coloration: Skin is not pale. Findings: No rash. Neurological:      Mental Status: She is alert and oriented to person, place, and time.    Psychiatric:         Behavior: Behavior normal.         Labs:   Lab Results   Component Value Date    HGBA1C 5.2 05/31/2019       Lab Results   Component Value Date    HWB8WPSPNODM 0.309 (L) 02/02/2023    TSH 0.96 05/31/2019    D3SZMXR 1.0 01/27/2017       Lab Results   Component Value Date    CREATININE 0.78 02/02/2023    CREATININE 0.88 04/25/2022    CREATININE 0.66 08/14/2019    BUN 7 02/02/2023    K 3.7 02/02/2023     02/02/2023    CO2 27 02/02/2023     eGFR   Date Value Ref Range Status   02/02/2023 74 ml/min/1.73sq m Final       Lab Results   Component Value Date    ALT 23 02/02/2023    AST 16 02/02/2023    ALKPHOS 55 02/02/2023       Lab Results   Component Value Date    CHOLESTEROL 268 (H) 02/02/2023    CHOLESTEROL 237 (H) 04/25/2022    CHOLESTEROL 254 (H) 05/31/2019     Lab Results   Component Value Date    HDL 58 02/02/2023     04/25/2022     05/31/2019     Lab Results   Component Value Date    TRIG 115 02/02/2023    TRIG 92 04/25/2022    TRIG 84 05/31/2019     Lab Results   Component Value Date    NONHDLC 210 02/02/2023    [address] 124 04/25/2022         Impression:  1. Hypothyroidism, unspecified type    2. Osteopenia, unspecified location    3. Vitamin D deficiency    4. Mixed hyperlipidemia    5. Other fatigue    6. Paroxysmal atrial tachycardia ([address])         Plan:    Diagnoses and all orders for this visit:    Hypothyroidism, unspecified type. She may be overtreated in the context of paroxysmal atrial tachycardia. Today we discussed all aspects of hypothyroidism including normal thyroid physiology, pathophysiology, review of data, treatment options, dose titration and follow up needs. We will first assess her complete thyroid profile including antibody profile as listed below based on which we can consider dose adjustment of levothyroxine. Goal TSH is less than 5 µIU/mL and goal free T4 is > 0.8 ng/dL if she is clinically able to tolerate. Follow-up in 4 weeks either in person or as telemedicine.    -     T4, free; Future  -     TSH, 3rd generation; Future  -     Thyroid Antibodies Panel; Future  -     Thyroid stimulating immunoglobulin; Future  -     T3; Future  -     Iodine, Serum/Plasma; Future    Osteopenia, unspecified location. Last DEXA in 2019 showed osteopenia. Reasonable to repeat a DEXA bone scan. Vitamin D deficiency  -     Vitamin D 25 hydroxy;  Future    Mixed hyperlipidemia  - Omega 3 and 6 Fatty Acids; Future    Other fatigue  -     Iron Panel (Includes Ferritin, Iron Sat%, Iron, and TIBC); Future  -     High sensitivity CRP; Future    Paroxysmal atrial tachycardia. She follows with Dr. Chirag Valdes from cardiology. She inquired about use of beta-blockers which I think can help to reduce the beta adrenergic activity from the thyroid medication. He may have been previously sensitized by use of Cytomel. It is reasonable to consider a small dose of metoprolol in addition to her Cardizem but this decision will be best made by cardiology. I have spent 45 minutes with patient today in which greater than 50% of this time was spent in counseling/coordination of care. Discussed with the patient and all questioned fully answered. She will call me if any problems arise. Educated/ Counseled patient on diagnostic test results, prognosis, risk vs benefit of treatment options, importance of treatment compliance, healthy life and lifestyle choices.       Claudia

## 2023-08-23 ENCOUNTER — APPOINTMENT (OUTPATIENT)
Dept: LAB | Facility: CLINIC | Age: 78
End: 2023-08-23
Payer: COMMERCIAL

## 2023-08-23 DIAGNOSIS — E55.9 VITAMIN D DEFICIENCY: ICD-10-CM

## 2023-08-23 DIAGNOSIS — E78.2 MIXED HYPERLIPIDEMIA: ICD-10-CM

## 2023-08-23 DIAGNOSIS — R53.83 OTHER FATIGUE: ICD-10-CM

## 2023-08-23 DIAGNOSIS — E03.9 HYPOTHYROIDISM, UNSPECIFIED TYPE: ICD-10-CM

## 2023-08-23 LAB
25(OH)D3 SERPL-MCNC: 44.2 NG/ML (ref 30–100)
CRP SERPL HS-MCNC: 0.77 MG/L
FERRITIN SERPL-MCNC: 54 NG/ML (ref 11–307)
IRON SATN MFR SERPL: 23 % (ref 15–50)
IRON SERPL-MCNC: 79 UG/DL (ref 50–212)
T3 SERPL-MCNC: 1.3 NG/ML
T4 FREE SERPL-MCNC: 1.03 NG/DL (ref 0.61–1.12)
TIBC SERPL-MCNC: 342 UG/DL (ref 250–450)
TSH SERPL DL<=0.05 MIU/L-ACNC: 0.93 UIU/ML (ref 0.45–4.5)
UIBC SERPL-MCNC: 263 UG/DL (ref 155–355)

## 2023-08-23 PROCEDURE — 84480 ASSAY TRIIODOTHYRONINE (T3): CPT

## 2023-08-23 PROCEDURE — 86800 THYROGLOBULIN ANTIBODY: CPT

## 2023-08-23 PROCEDURE — 83540 ASSAY OF IRON: CPT

## 2023-08-23 PROCEDURE — 83550 IRON BINDING TEST: CPT

## 2023-08-23 PROCEDURE — 84445 ASSAY OF TSI GLOBULIN: CPT

## 2023-08-23 PROCEDURE — 82542 COL CHROMOTOGRAPHY QUAL/QUAN: CPT

## 2023-08-23 PROCEDURE — 86376 MICROSOMAL ANTIBODY EACH: CPT

## 2023-08-23 PROCEDURE — 83789 MASS SPECTROMETRY QUAL/QUAN: CPT

## 2023-08-23 PROCEDURE — 84443 ASSAY THYROID STIM HORMONE: CPT

## 2023-08-23 PROCEDURE — 82306 VITAMIN D 25 HYDROXY: CPT

## 2023-08-23 PROCEDURE — 82728 ASSAY OF FERRITIN: CPT

## 2023-08-23 PROCEDURE — 84439 ASSAY OF FREE THYROXINE: CPT

## 2023-08-24 ENCOUNTER — TELEPHONE (OUTPATIENT)
Dept: ENDOCRINOLOGY | Facility: CLINIC | Age: 78
End: 2023-08-24

## 2023-08-24 DIAGNOSIS — E03.9 HYPOTHYROIDISM, UNSPECIFIED TYPE: Primary | ICD-10-CM

## 2023-08-24 LAB
THYROGLOB AB SERPL-ACNC: <1 IU/ML (ref 0–0.9)
THYROPEROXIDASE AB SERPL-ACNC: 12 IU/ML (ref 0–34)

## 2023-08-24 NOTE — TELEPHONE ENCOUNTER
Dr. Tres Morataya called in for you regarding pt's lab results from yesterday. He is requesting a script to be sent in to [facility] on file for Levothyroxine lowering the dose to 40 mcg or even 38 depending on your recommendation. Stated pt has been on too high of a dose for a while and that is causing the hyperthyroidism    Please contact him with any issues/concerns.

## 2023-08-25 DIAGNOSIS — E03.9 HYPOTHYROIDISM, UNSPECIFIED TYPE: Primary | ICD-10-CM

## 2023-08-25 LAB
IODINE SERPL-MCNC: 43.4 UG/L (ref 40–92)
TSI SER-ACNC: <0.1 IU/L (ref 0–0.55)

## 2023-08-25 RX ORDER — LEVOTHYROXINE SODIUM 0.03 MG/1
TABLET ORAL
Qty: 100 TABLET | Refills: 0 | Status: SHIPPED | OUTPATIENT
Start: 2023-08-25

## 2023-08-25 NOTE — TELEPHONE ENCOUNTER
Spoke with Dr. Kia Israel this am and he would like you to review the previous lab results all the way back to 2017. Pt wants to decrease to 37 mcg and she is calling Dr. Rizwana Gustafson today re: dosage. He is requesting that you look over records to see if pt was over medicated and would recommend the decrease.

## 2023-09-06 ENCOUNTER — HOSPITAL ENCOUNTER (OUTPATIENT)
Dept: RADIOLOGY | Age: 78
Discharge: HOME | End: 2023-09-06
Payer: COMMERCIAL

## 2023-09-06 DIAGNOSIS — Z78.0 POST-MENOPAUSAL: ICD-10-CM

## 2023-09-06 DIAGNOSIS — M85.80 OSTEOPENIA, UNSPECIFIED LOCATION: ICD-10-CM

## 2023-09-06 PROCEDURE — 77080 DXA BONE DENSITY AXIAL: CPT

## 2023-09-20 LAB — MISCELLANEOUS LAB TEST RESULT: NORMAL

## 2023-09-21 ENCOUNTER — APPOINTMENT (OUTPATIENT)
Dept: LAB | Facility: CLINIC | Age: 78
End: 2023-09-21
Payer: COMMERCIAL

## 2023-09-21 ENCOUNTER — TRANSCRIBE ORDERS (OUTPATIENT)
Dept: LAB | Facility: CLINIC | Age: 78
End: 2023-09-21

## 2023-09-21 DIAGNOSIS — E03.9 HYPOTHYROIDISM, ADULT: Primary | ICD-10-CM

## 2023-09-21 LAB
T4 FREE SERPL-MCNC: 1.01 NG/DL (ref 0.61–1.12)
TSH SERPL DL<=0.05 MIU/L-ACNC: 0.9 UIU/ML (ref 0.45–4.5)

## 2023-09-21 PROCEDURE — 84443 ASSAY THYROID STIM HORMONE: CPT

## 2023-09-21 PROCEDURE — 84439 ASSAY OF FREE THYROXINE: CPT

## 2023-09-21 PROCEDURE — 36415 COLL VENOUS BLD VENIPUNCTURE: CPT

## 2023-09-25 ENCOUNTER — TELEPHONE (OUTPATIENT)
Dept: ENDOCRINOLOGY | Facility: CLINIC | Age: 78
End: 2023-09-25

## 2023-09-25 NOTE — TELEPHONE ENCOUNTER
Pt wanted to let you know she has been completely off of Levothyroxine for 4 days now. Stopped by PCP.

## 2023-09-27 NOTE — TELEPHONE ENCOUNTER
OK- she should make sure to repeat the lab testing (TSH/Free T4) prior to next visit so we can see how she does off the medication. If she is feeling worsening symptoms of hypothyroidism she can  let us know and can repeat labs soon.   (Fatigue, weight gain, constipation, dry skin,  hair loss)

## 2023-10-20 ENCOUNTER — APPOINTMENT (OUTPATIENT)
Dept: LAB | Facility: CLINIC | Age: 78
End: 2023-10-20
Payer: COMMERCIAL

## 2023-10-20 DIAGNOSIS — E03.9 HYPOTHYROIDISM, UNSPECIFIED TYPE: ICD-10-CM

## 2023-10-20 LAB — T4 SERPL-MCNC: 5.93 UG/DL (ref 6.09–12.23)

## 2023-10-20 PROCEDURE — 84436 ASSAY OF TOTAL THYROXINE: CPT

## 2023-10-23 ENCOUNTER — OFFICE VISIT (OUTPATIENT)
Dept: ENDOCRINOLOGY | Facility: CLINIC | Age: 78
End: 2023-10-23
Payer: COMMERCIAL

## 2023-10-23 VITALS
SYSTOLIC BLOOD PRESSURE: 140 MMHG | WEIGHT: 145 LBS | BODY MASS INDEX: 26.68 KG/M2 | HEIGHT: 62 IN | TEMPERATURE: 97.3 F | HEART RATE: 45 BPM | DIASTOLIC BLOOD PRESSURE: 84 MMHG | OXYGEN SATURATION: 99 %

## 2023-10-23 DIAGNOSIS — E55.9 VITAMIN D DEFICIENCY: ICD-10-CM

## 2023-10-23 DIAGNOSIS — E78.2 MIXED HYPERLIPIDEMIA: ICD-10-CM

## 2023-10-23 DIAGNOSIS — M85.80 OSTEOPENIA, UNSPECIFIED LOCATION: ICD-10-CM

## 2023-10-23 DIAGNOSIS — E03.9 HYPOTHYROIDISM, UNSPECIFIED TYPE: Primary | ICD-10-CM

## 2023-10-23 PROCEDURE — 99215 OFFICE O/P EST HI 40 MIN: CPT | Performed by: INTERNAL MEDICINE

## 2023-10-23 RX ORDER — MELATONIN
1000 DAILY
COMMUNITY

## 2023-10-23 RX ORDER — ATORVASTATIN CALCIUM 20 MG/1
20 TABLET, FILM COATED ORAL DAILY
Qty: 90 TABLET | Refills: 0 | Status: SHIPPED | OUTPATIENT
Start: 2023-10-23

## 2023-10-23 NOTE — PROGRESS NOTES
Follow-up Patient Progress Note      CC: Hypothyroidism     History of Present Illness:   74yr-year-old female with HTN, osteopenia, vitamin D deficiency, HLD, hypothyroidism since age 50 and recently diagnosed paroxysmal atrial tachycardia. Last visit was 23. She had a second opinion from Dr. Razia Callahan at [facility] endocrinology 10/11/23. Since last visit, she lost 5 lbs. Since last visit, levothyroxine dose was completely weaned off. Last visit was 23. She was diagnosed with hypothyroidism roughly age 50 at which time, she was placed on Tirosint and Cytomel. In 2018, after moving to [facility], she was seen by Dr. Drummond File and transition to levothyroxine 75 mcg and then 88 mcg respectively. More recently, her levothyroxine dose was reduced to 50 mcg daily. 2019 DEXA: T-scores 1.0 LS, -1.4 LTH, -1.5 LFN. 10-year FRAX score was 1.6% hip and 10% major osteoporotic fracture. 23 DXA: Tscores -0.5LS, -1.3 LTH and -1.2LFN. somewhat better than prior. FRAx score was 2% hip 11% major  osteoporotic fracture.       Patient Active Problem List   Diagnosis    Hypothyroidism    Vitamin D deficiency    Osteopenia    Hyperglycemia    Primary hypertension    Mixed hyperlipidemia    PAC (premature atrial contraction)     Past Medical History:   Diagnosis Date    Arthritis     PTSD (post-traumatic stress disorder)       Past Surgical History:   Procedure Laterality Date    BUNIONECTOMY Right     KNEE ARTHROSCOPY W/ MENISCAL REPAIR Right 2009    REPLACEMENT TOTAL KNEE Right 2017      Family History   Problem Relation Age of Onset    Heart disease Mother     Stroke Mother     Lung disease Father         black lung disease    Heart attack Father         age 58     Social History     Tobacco Use    Smoking status: Former     Types: Cigarettes     Quit date: 1977     Years since quittin.1    Smokeless tobacco: Never   Substance Use Topics    Alcohol use: Not Currently     No Known Allergies      Current Outpatient Medications:     ALPRAZolam (XANAX) 0.5 mg tablet, TAKE 1 TABLET EVERY 8 HOURS AS NEEDED, Disp: , Rfl: 5    cholecalciferol (VITAMIN D3) 1,000 units tablet, Take 1,000 Units by mouth daily, Disp: , Rfl:     Multiple Vitamin (THERAGRAN PO), Take by mouth, Disp: , Rfl:     Specialty Vitamins Products (HAIR BOOSTER PO), Take by mouth, Disp: , Rfl:     diltiazem (DILACOR XR) 120 MG 24 hr capsule, TAKE 1 CAPSULE BY MOUTH EVERY DAY (Patient not taking: Reported on 8/22/2023), Disp: 90 capsule, Rfl: 2    levothyroxine (Levoxyl) 25 mcg tablet, Take 1.5tabs daily (Patient not taking: Reported on 10/23/2023), Disp: 100 tablet, Rfl: 0    Review of Systems   HENT: Negative. Eyes: Negative. Respiratory: Negative. Cardiovascular: Negative. Gastrointestinal: Negative. Endocrine: Negative. Musculoskeletal: Negative. Skin: Negative. Allergic/Immunologic: Negative. Neurological: Negative. Hematological: Negative. Psychiatric/Behavioral: Negative. Physical Exam:  Body mass index is 26.52 kg/m². /84 (BP Location: Right arm, Patient Position: Sitting, Cuff Size: Standard)   Pulse (!) 45   Temp (!) 97.3 °F (36.3 °C) (Tympanic)   Ht 5' 2" (1.575 m)   Wt 65.8 kg (145 lb)   SpO2 99%   BMI 26.52 kg/m²    Vitals:    10/23/23 1022   Weight: 65.8 kg (145 lb)        Physical Exam  Constitutional:       General: She is not in acute distress. Appearance: She is well-developed. She is not ill-appearing. HENT:      Head: Normocephalic and atraumatic. Nose: Nose normal.      Mouth/Throat:      Pharynx: Oropharynx is clear. Eyes:      Extraocular Movements: Extraocular movements intact. Conjunctiva/sclera: Conjunctivae normal.   Neck:      Thyroid: No thyromegaly. Cardiovascular:      Rate and Rhythm: Normal rate. Pulmonary:      Effort: Pulmonary effort is normal.   Musculoskeletal:         General: No deformity.       Cervical back: Normal range of motion. Skin:     Capillary Refill: Capillary refill takes less than 2 seconds. Coloration: Skin is not pale. Findings: No rash. Neurological:      Mental Status: She is alert and oriented to person, place, and time. Psychiatric:         Behavior: Behavior normal.         Labs:   Lab Results   Component Value Date    HGBA1C 5.2 05/31/2019       Lab Results   Component Value Date    HUK4QQMTJLLA 2.891 10/20/2023    TSH 0.96 05/31/2019    Z1RQIPQ 1.3 08/23/2023    G1PZVWG 5.93 (L) 10/20/2023       Lab Results   Component Value Date    CREATININE 0.78 02/02/2023    CREATININE 0.88 04/25/2022    CREATININE 0.66 08/14/2019    BUN 7 02/02/2023    K 3.7 02/02/2023     02/02/2023    CO2 27 02/02/2023     eGFR   Date Value Ref Range Status   02/02/2023 74 ml/min/1.73sq m Final       Lab Results   Component Value Date    ALT 23 02/02/2023    AST 16 02/02/2023    ALKPHOS 55 02/02/2023       Lab Results   Component Value Date    CHOLESTEROL 268 (H) 02/02/2023    CHOLESTEROL 237 (H) 04/25/2022    CHOLESTEROL 254 (H) 05/31/2019     Lab Results   Component Value Date    HDL 58 02/02/2023     04/25/2022     05/31/2019     Lab Results   Component Value Date    TRIG 115 02/02/2023    TRIG 92 04/25/2022    TRIG 84 05/31/2019     Lab Results   Component Value Date    NONHDLC 210 02/02/2023    [address] [facility] 124 04/25/2022         Impression:  1. Hypothyroidism, unspecified type    2. Osteopenia, unspecified location    3. Mixed hyperlipidemia    4. Vitamin D deficiency         Plan:    Rebecca Ochoa was seen today for follow-up. Diagnoses and all orders for this visit:    Hypothyroidism, unspecified type. She seems clinically euthyroid. Recent  TSH was normal but fT4/ total T4 were low normal.    She is off levothyroxine since 9/20/23. Recommended repeat labs in 2-3 months to make sure she remains euthyroid. For accuracy, we will use fT4 by mass spectrometry.     Follow up in 3 months.    -     TSH, 3rd generation; Future  -     T4, free; Future  -     Free T4 by Dialysis/Mass Spec; Future    Osteopenia, unspecified location. Today we discussed all aspects of osteopenia/osteoporosis including pathophysiology, risk factors, complications, diet, calcium 1200mg/day, vitamin D supplementation to maintain goal >30ng/dL, lifestyle modifications, medical fitness training, goals of therapy, follow up needs and medications. We agreed to use calcium vitamin D and exercise. Mixed hyperlipidemia. Based on ASCVD risk 27%, she will benefit from a statin therapy. Discussed other optons including novel agents. Discussed normal lipidology. Vitamin D deficiency        I have spent 42 minutes with patient today in which greater than 50% of this time was spent in counseling/coordination of care as discussed above. Discussed with the patient and all questioned fully answered. She will call me if any problems arise. Educated/ Counseled patient on diagnostic test results, prognosis, risk vs benefit of treatment options, importance of treatment compliance, healthy life and lifestyle choices.       Claudia

## 2023-10-30 ENCOUNTER — OFFICE VISIT (OUTPATIENT)
Dept: CARDIOLOGY CLINIC | Facility: CLINIC | Age: 78
End: 2023-10-30
Payer: COMMERCIAL

## 2023-10-30 VITALS
HEIGHT: 62 IN | SYSTOLIC BLOOD PRESSURE: 122 MMHG | WEIGHT: 143 LBS | DIASTOLIC BLOOD PRESSURE: 72 MMHG | BODY MASS INDEX: 26.31 KG/M2 | HEART RATE: 92 BPM

## 2023-10-30 DIAGNOSIS — I49.1 PAC (PREMATURE ATRIAL CONTRACTION): Primary | ICD-10-CM

## 2023-10-30 PROCEDURE — 99215 OFFICE O/P EST HI 40 MIN: CPT | Performed by: INTERNAL MEDICINE

## 2023-10-30 PROCEDURE — 93000 ELECTROCARDIOGRAM COMPLETE: CPT | Performed by: INTERNAL MEDICINE

## 2023-10-30 NOTE — PROGRESS NOTES
EPS Follow-up Patient Evaluation - Carter Bumpers 70 y.o. female MRN: [id number]       Referring: Dr. Rebeka Mccormick    CC/HPI:   It was a pleasure to see Carter Bumpers in our arrhythmia clinic at [facility]. As you know she is a 70 y.o. woman with arthritis, PTSD who presented 7/20/23 to discuss management of PVCs. Patient had Holter monitor which revealed occasional PACs with 1.3% burden and rare PVCs. 2-week cardiac event monitor was performed which revealed 11% PAC burden. She also had short runs of atrial tachycardia. Echocardiogram was normal and she was recommended to stop caffeine. She declined trial of beta-blocker and preferred to discuss further management with electrophysiology. She presented with her friend who is an ER physician and has training in surgery. Patient reported having palpitations and at times dizziness. She does not feel palpitation all the time however only when her heart is racing. She has noted heart racing up to 120 bpm.  She has cut down on her caffeine intake and consumes a vegan diet. She will also decrease her alcohol intake as well. She denies any swelling in lower extremity, orthopnea, PND or syncope. Interval history:    She had cardiac event monitor performed which showed AT episodes with longest episode 20 bpm, and fastest episode at 200 bpm, for 13 beats. She was recommended to start diltiazem. She has seen multiple endocrinologist and they have tapered her of levothyroxine. She has been off of it for one month now. She notices palpitations (rapid heart beats) are less frequent now (2-3 times/day), lasting less than minutes.      Past Medical History:  Past Medical History:   Diagnosis Date    Arthritis     PTSD (post-traumatic stress disorder)        Medications:      Current Outpatient Medications:     ALPRAZolam (XANAX) 0.5 mg tablet, TAKE 1 TABLET EVERY 8 HOURS AS NEEDED, Disp: , Rfl: 5    cholecalciferol (VITAMIN D3) 1,000 units tablet, Take 1,000 Units by mouth daily, Disp: , Rfl:     Multiple Vitamin (THERAGRAN PO), Take by mouth, Disp: , Rfl:     Specialty Vitamins Products (HAIR BOOSTER PO), Take by mouth, Disp: , Rfl:     atorvastatin (LIPITOR) 20 mg tablet, Take 1 tablet (20 mg total) by mouth daily, Disp: 90 tablet, Rfl: 0    diltiazem (DILACOR XR) 120 MG 24 hr capsule, TAKE 1 CAPSULE BY MOUTH EVERY DAY (Patient not taking: Reported on 2023), Disp: 90 capsule, Rfl: 2    levothyroxine (Levoxyl) 25 mcg tablet, Take 1.5tabs daily (Patient not taking: Reported on 10/23/2023), Disp: 100 tablet, Rfl: 0     Family History   Problem Relation Age of Onset    Heart disease Mother     Stroke Mother     Lung disease Father         black lung disease    Heart attack Father         age 58     Social History     Socioeconomic History    Marital status:       Spouse name: Not on file    Number of children: Not on file    Years of education: Not on file    Highest education level: Not on file   Occupational History    Not on file   Tobacco Use    Smoking status: Former     Types: Cigarettes     Quit date: 1977     Years since quittin.1    Smokeless tobacco: Never   Substance and Sexual Activity    Alcohol use: Not Currently    Drug use: No    Sexual activity: Not Currently     Partners: Male   Other Topics Concern    Not on file   Social History Narrative    Not on file     Social Determinants of Health     Financial Resource Strain: Not on file   Food Insecurity: Not on file   Transportation Needs: Not on file   Physical Activity: Not on file   Stress: Not on file   Social Connections: Not on file   Intimate Partner Violence: Not on file   Housing Stability: Not on file     Social History     Tobacco Use   Smoking Status Former    Types: Cigarettes    Quit date: 1977    Years since quittin.1   Smokeless Tobacco Never     Social History     Substance and Sexual Activity   Alcohol Use Not Currently       Review of Systems   Constitutional: Negative for chills and fever. HENT: Negative. Eyes:  Negative for blurred vision and double vision. Cardiovascular:  Positive for palpitations. Negative for chest pain, dyspnea on exertion, leg swelling, near-syncope, orthopnea, paroxysmal nocturnal dyspnea and syncope. Respiratory:  Negative for cough and sputum production. Endocrine: Negative. Skin: Negative. Negative for rash. Musculoskeletal: Negative. Negative for arthritis and joint pain. Gastrointestinal:  Negative for abdominal pain, nausea and vomiting. Genitourinary: Negative. Neurological:  Negative for dizziness and light-headedness. Psychiatric/Behavioral: Negative. The patient is not nervous/anxious. Objective:     Vitals: Blood pressure 122/72, pulse 92, height 5' 2" (1.575 m), weight 64.9 kg (143 lb). , Body mass index is 26.16 kg/m². ,        Physical Exam:    GEN: Marilin Chavira appears well, alert and oriented x 3, pleasant and cooperative   HEENT: pupils equal, round, and reactive to light; extraocular muscles intact  NECK: supple, no carotid bruits   HEART: regular rhythm with ectopic beats, normal S1 and S2, no murmurs, clicks, gallops or rubs   LUNGS: clear to auscultation bilaterally; no wheezes, rales, or rhonchi   ABDOMEN: normal bowel sounds, soft, no tenderness, no distention  EXTREMITIES: peripheral pulses normal; no clubbing, cyanosis, or edema  NEURO: no focal findings   SKIN: normal without suspicious lesions on exposed skin      Labs & Results:  Below is the patient's most recent value for Albumin, ALT, AST, BUN, Calcium, Chloride, Cholesterol, CO2, Creatinine, GFR, Glucose, HDL, Hematocrit, Hemoglobin, Hemoglobin A1C, LDL, Magnesium, Phosphorus, Platelets, Potassium, PSA, Sodium, Triglycerides, and WBC.    Lab Results   Component Value Date    ALT 23 02/02/2023    AST 16 02/02/2023    BUN 7 02/02/2023    CALCIUM 9.6 02/02/2023     02/02/2023    CO2 27 02/02/2023    CREATININE 0.78 02/02/2023    HDL 58 02/02/2023    HCT 43.7 02/02/2023    HGB 14.2 02/02/2023    HGBA1C 5.2 05/31/2019    MG 2.0 03/30/2023     02/02/2023    K 3.7 02/02/2023    TRIG 115 02/02/2023    WBC 3.89 (L) 02/02/2023     Note: for a comprehensive list of the patient's lab results, access the Results Review activity. Cardiac testing:     I personally reviewed the ECG performed in the clinic on 10/30/23. It reveals normal sinus rhythm with PAC. Echocardiograms:  No results found for this or any previous visit. No results found for this or any previous visit. Catheterizations:   No results found for this or any previous visit. Stress Tests:  No results found for this or any previous visit. Holter monitor -   No results found for this or any previous visit. No results found for this or any previous visit. ASSESSMENT/PLAN:      1. PACs  Recent cardiac event monitor revealed 11% burden  Beta-blocker was offered but patient declined  Currently EKG shows atrial bigeminy  She occasionally feels palpitations and dizziness  We discussed neck steps in management including AV marla blocking agents versus antiarrhythmic therapy  Prescribed diltiazem 120 mg daily  Event monitor showed AT runs. Patient advised to start diltiazem but has not take it. Levothyroxine discontinued by endocrinologist.   Patient notes frequency of episodes has decreased significantly. Emphasized that atrial bigeminy is benign and should not lead to atrial fibrillation unless she has other risk factors including alcohol, caffeine, infection, sleep apnea  She is interested in metoprolol if she has increased symptoms. 2.  Hypertension  Normotensive in the office  Patient was offered lisinopril in the past but she self discontinued  She will check her BP at home on daily basis and keep a log of it    3.   Hyperlipidemia  Elevated LDL levels  Further management per primary cardiologist    4.  Hypothyroid  Patient was on levothyroxine,  discontinued by endocrinologist  Repeat testing planned for January    Follow-up in 6 months

## 2024-01-08 ENCOUNTER — TELEPHONE (OUTPATIENT)
Age: 79
End: 2024-01-08

## 2024-01-08 ENCOUNTER — TELEPHONE (OUTPATIENT)
Dept: ENDOCRINOLOGY | Facility: CLINIC | Age: 79
End: 2024-01-08

## 2024-01-08 NOTE — TELEPHONE ENCOUNTER
Pt left v/m asking if lab work can be ordered for cholesterol so she can have it done prior to her 1/23 appt.     Left v/m for pt that I was sending a message.

## 2024-01-08 NOTE — TELEPHONE ENCOUNTER
Patients GI provider:  Dr. Dupont    Number to return call: [phone]    Reason for call: Pt calling in requesting to have patients appt changed from o/v to virtual due to covid on the rise. Patient can be reached at the number above, thank you.    Scheduled procedure/appointment date if applicable: Apt/procedure 01/16/2024

## 2024-01-08 NOTE — TELEPHONE ENCOUNTER
LMOM advising patient unfortunately her appt can't be switched to virtual because she has never been seen in GI. She is more than welcome to wear a mask or reschedule per Julissa GALEANO.

## 2024-01-10 NOTE — TELEPHONE ENCOUNTER
Pt returned our call and stated that she will wear a mask and asked if anyone would be wearing a mask. I informed the pt that I did not know. She will keep the appt.

## 2024-01-15 ENCOUNTER — APPOINTMENT (OUTPATIENT)
Dept: LAB | Facility: CLINIC | Age: 79
End: 2024-01-15
Payer: COMMERCIAL

## 2024-01-15 DIAGNOSIS — E03.9 HYPOTHYROIDISM, UNSPECIFIED TYPE: ICD-10-CM

## 2024-01-15 LAB
T4 FREE SERPL-MCNC: 0.87 NG/DL (ref 0.61–1.12)
TSH SERPL DL<=0.05 MIU/L-ACNC: 2.4 UIU/ML (ref 0.45–4.5)

## 2024-01-15 PROCEDURE — 36415 COLL VENOUS BLD VENIPUNCTURE: CPT

## 2024-01-15 PROCEDURE — 84439 ASSAY OF FREE THYROXINE: CPT

## 2024-01-15 PROCEDURE — 84443 ASSAY THYROID STIM HORMONE: CPT

## 2024-01-16 ENCOUNTER — TELEPHONE (OUTPATIENT)
Age: 79
End: 2024-01-16

## 2024-01-16 ENCOUNTER — TELEMEDICINE (OUTPATIENT)
Dept: GASTROENTEROLOGY | Facility: CLINIC | Age: 79
End: 2024-01-16
Payer: COMMERCIAL

## 2024-01-16 ENCOUNTER — TELEPHONE (OUTPATIENT)
Dept: GASTROENTEROLOGY | Facility: CLINIC | Age: 79
End: 2024-01-16

## 2024-01-16 DIAGNOSIS — Z86.010 HISTORY OF COLON POLYPS: Primary | ICD-10-CM

## 2024-01-16 PROCEDURE — 99203 OFFICE O/P NEW LOW 30 MIN: CPT | Performed by: INTERNAL MEDICINE

## 2024-01-16 RX ORDER — BISACODYL 5 MG/1
10 TABLET, DELAYED RELEASE ORAL ONCE
Qty: 2 TABLET | Refills: 0 | Status: SHIPPED | OUTPATIENT
Start: 2024-01-16 | End: 2024-01-16

## 2024-01-16 NOTE — TELEPHONE ENCOUNTER
Scheduled date of colonoscopy (as of today):04.19.24  Physician performing colonoscopy:DR STUBBS  Location of colonoscopy:[facility]  Bowel prep reviewed with patient:RODRI  Instructions reviewed with patient by:MAILED  Clearances: N/A

## 2024-01-16 NOTE — TELEPHONE ENCOUNTER
----- Message from Prince Weathers sent at 1/16/2024 11:53 AM EST -----    ----- Message -----  From: Curly Dupont MD  Sent: 1/16/2024  11:47 AM EST  To: #    Pt needs colonoscopy for history of  polyps in April at [facility].

## 2024-01-16 NOTE — TELEPHONE ENCOUNTER
Patient contacted office stating she is having trouble getting into virtual on mychart. As per office Dr. Maier was attempting to call patient. Patient hung up line.

## 2024-01-16 NOTE — PROGRESS NOTES
Virtual Regular Visit    Verification of patient location:    Patient is located at Home in the following state in which I hold an active license [place]      Assessment/Plan:    Problem List Items Addressed This Visit    None           Reason for visit is   Chief Complaint   Patient presents with    Virtual Regular Visit          Encounter provider Curly Dupont MD    Provider located at [facility] [place]  [address] [facility] [place] [address]  [phone]      Recent Visits  No visits were found meeting these conditions.  Showing recent visits within past 7 days and meeting all other requirements  Today's Visits  Date Type Provider Dept   01/16/24 Telemedicine Curly Dupont MD [facility]   Showing today's visits and meeting all other requirements  Future Appointments  No visits were found meeting these conditions.  Showing future appointments within next 150 days and meeting all other requirements       The patient was identified by name and date of birth. Milka Johnson was informed that this is a telemedicine visit and that the visit is being conducted through the Epic Embedded platform. She agrees to proceed..  My office door was closed. No one else was in the room.  She acknowledged consent and understanding of privacy and security of the video platform. The patient has agreed to participate and understands they can discontinue the visit at any time.    Patient is aware this is a billable service.     Subjective  Milka Johnson is a 78 y.o. female here for a colon consult .    Had a colonoscopy in 2017 with polyps. She was due during the pandemic but then did put if off.  No blood in stool. No weight loss. No family history of colon cancer. No change in bowel habits. Not on an blood thinners.    She is concerned about covid as she had a friend become of covid positive so is concerned about going       Past Medical History:   Diagnosis  Date    Arthritis     PTSD (post-traumatic stress disorder)        Past Surgical History:   Procedure Laterality Date    BUNIONECTOMY Right     KNEE ARTHROSCOPY W/ MENISCAL REPAIR Right 2009    REPLACEMENT TOTAL KNEE Right 2017       Current Outpatient Medications   Medication Sig Dispense Refill    ALPRAZolam (XANAX) 0.5 mg tablet TAKE 1 TABLET EVERY 8 HOURS AS NEEDED  5    atorvastatin (LIPITOR) 20 mg tablet Take 1 tablet (20 mg total) by mouth daily 90 tablet 0    cholecalciferol (VITAMIN D3) 1,000 units tablet Take 1,000 Units by mouth daily      diltiazem (DILACOR XR) 120 MG 24 hr capsule TAKE 1 CAPSULE BY MOUTH EVERY DAY (Patient not taking: Reported on 8/22/2023) 90 capsule 2    levothyroxine (Levoxyl) 25 mcg tablet Take 1.5tabs daily (Patient not taking: Reported on 10/23/2023) 100 tablet 0    Multiple Vitamin (THERAGRAN PO) Take by mouth      Specialty Vitamins Products (HAIR BOOSTER PO) Take by mouth       No current facility-administered medications for this visit.        No Known Allergies    REVIEW OF SYSTEMS:    CONSTITUTIONAL: Denies any fever, chills, rigors, and weight loss.  HEENT: No earache or tinnitus. Denies hearing loss or visual disturbances.  CARDIOVASCULAR: No chest pain or palpitations.   RESPIRATORY: Denies any cough, hemoptysis, shortness of breath or dyspnea on exertion.  GASTROINTESTINAL: As noted in the History of Present Illness.   GENITOURINARY: No problems with urination. Denies any hematuria or dysuria.  NEUROLOGIC: No dizziness or vertigo, denies headaches.   MUSCULOSKELETAL: Denies any muscle or joint pain.   SKIN: Denies skin rashes or itching.   ENDOCRINE: Denies excessive thirst. Denies intolerance to heat or cold.  PSYCHOSOCIAL: Denies depression or anxiety. Denies any recent memory loss.       PHYSICAL EXAMINATION:        Visit Time  Total Visit Duration: 22 minutes    Answers submitted by the patient for this visit:  Abdominal Pain Questionnaire (Submitted on  1/16/2024)  Chief Complaint: Abdominal pain  Progression since onset: unchanged  Pain - numeric: 0/10  Radiates to: does not radiate  anorexia: No  arthralgias: No  belching: No  constipation: No  diarrhea: No  dysuria: No  fever: No  flatus: No  frequency: No  headaches: No  hematochezia: No  hematuria: No  melena: No  myalgias: No  nausea: No  weight loss: No  vomiting: No  Aggravated by: nothing  Relieved by: nothing

## 2024-01-21 LAB — T4 FREE SERPL DIALY-MCNC: 1 NG/DL

## 2024-01-23 ENCOUNTER — OFFICE VISIT (OUTPATIENT)
Dept: ENDOCRINOLOGY | Facility: CLINIC | Age: 79
End: 2024-01-23
Payer: COMMERCIAL

## 2024-01-23 VITALS
HEART RATE: 84 BPM | OXYGEN SATURATION: 100 % | TEMPERATURE: 97.5 F | BODY MASS INDEX: 26.13 KG/M2 | WEIGHT: 142 LBS | DIASTOLIC BLOOD PRESSURE: 84 MMHG | RESPIRATION RATE: 14 BRPM | SYSTOLIC BLOOD PRESSURE: 142 MMHG | HEIGHT: 62 IN

## 2024-01-23 DIAGNOSIS — E03.9 HYPOTHYROIDISM, UNSPECIFIED TYPE: Primary | ICD-10-CM

## 2024-01-23 DIAGNOSIS — E55.9 VITAMIN D DEFICIENCY: ICD-10-CM

## 2024-01-23 DIAGNOSIS — E78.2 MIXED HYPERLIPIDEMIA: ICD-10-CM

## 2024-01-23 PROCEDURE — 99214 OFFICE O/P EST MOD 30 MIN: CPT | Performed by: INTERNAL MEDICINE

## 2024-01-23 NOTE — PROGRESS NOTES
"  Follow-up Patient Progress Note      CC: Hypothyroidism     History of Present Illness:   78yr-year-old female with HTN, osteopenia, vitamin D deficiency, HLD, hypothyroidism since age 52 and recently diagnosed paroxysmal atrial tachycardia. Last visit was 10/23/23.     She had a second opinion from Dr. Lobo at [facility] 10/11/23.  Since last visit, she lost 3 lbs.     Since last visit, levothyroxine dose was completely weaned off.     She was diagnosed with hypothyroidism roughly age 52 at which time, she was placed on Tirosint and Cytomel.  In 2018, after moving to [facility], she was seen by Dr. Jensen and transition to levothyroxine 75 mcg and then 88 mcg respectively.  More recently, her levothyroxine dose was reduced to 50 mcg daily.     1/28/2019 DEXA: T-scores 1.0 LS, -1.4 LTH, -1.5 LFN.  10-year FRAX score was 1.6% hip and 10% major osteoporotic fracture.  9/6/23 DXA: Tscores -0.5LS, -1.3 LTH and -1.2LFN. somewhat better than prior. FRAx score was 2% hip 11% major  osteoporotic fracture.    Physical Exam:  Body mass index is 25.97 kg/m².  /84 (BP Location: Left arm, Patient Position: Sitting)   Pulse 84   Temp 97.5 °F (36.4 °C) (Tympanic)   Resp 14   Ht 5' 2\" (1.575 m)   Wt 64.4 kg (142 lb)   SpO2 100%   BMI 25.97 kg/m²    Vitals:    01/23/24 1253   Weight: 64.4 kg (142 lb)        Physical Exam  Constitutional:       General: She is not in acute distress.     Appearance: She is well-developed. She is not ill-appearing.   HENT:      Head: Normocephalic and atraumatic.      Nose: Nose normal.      Mouth/Throat:      Pharynx: Oropharynx is clear.   Eyes:      Extraocular Movements: Extraocular movements intact.      Conjunctiva/sclera: Conjunctivae normal.   Neck:      Thyroid: No thyromegaly.   Cardiovascular:      Rate and Rhythm: Normal rate.   Pulmonary:      Effort: Pulmonary effort is normal.   Musculoskeletal:         General: No deformity.      Cervical back: Normal " range of motion.   Skin:     Capillary Refill: Capillary refill takes less than 2 seconds.      Coloration: Skin is not pale.      Findings: No rash.   Neurological:      Mental Status: She is alert and oriented to person, place, and time.   Psychiatric:         Behavior: Behavior normal.         Labs:   Lab Results   Component Value Date    HGBA1C 5.2 05/31/2019       Lab Results   Component Value Date    CYB2RIHZUOBM 2.397 01/15/2024    TSH 0.96 05/31/2019    L5MMHXH 1.3 08/23/2023    R6OMEXO 5.93 (L) 10/20/2023       Lab Results   Component Value Date    CREATININE 0.78 02/02/2023    CREATININE 0.88 04/25/2022    CREATININE 0.66 08/14/2019    BUN 7 02/02/2023    K 3.7 02/02/2023     02/02/2023    CO2 27 02/02/2023     eGFR   Date Value Ref Range Status   02/02/2023 74 ml/min/1.73sq m Final       Lab Results   Component Value Date    ALT 23 02/02/2023    AST 16 02/02/2023    ALKPHOS 55 02/02/2023       Lab Results   Component Value Date    CHOLESTEROL 268 (H) 02/02/2023    CHOLESTEROL 237 (H) 04/25/2022    CHOLESTEROL 254 (H) 05/31/2019     Lab Results   Component Value Date    HDL 58 02/02/2023     04/25/2022     05/31/2019     Lab Results   Component Value Date    TRIG 115 02/02/2023    TRIG 92 04/25/2022    TRIG 84 05/31/2019     Lab Results   Component Value Date    NONHDLC 210 02/02/2023    NONHDLC 124 04/25/2022         Assessment/Plan:    Problem List Items Addressed This Visit          Endocrine    Hypothyroidism - Primary     She is clinically and biochemically euthyroid.  Recent TSH and fT4 were normal. fT4 by equilibrium dialysis correlated to regular fT4.         Relevant Orders    T4, free    TSH, 3rd generation       Other    Vitamin D deficiency     Vit D3 2000IU daily OTC         Mixed hyperlipidemia     ASCVD risk score is 21%. She should be on a high potency statin therapy based on ACC 2018 guideline.  Last LDL-c was 187mg/dL and non HDL was 201mg/dL. Lipoprotein A was  normal.         Relevant Orders    Lipid panel         I have spent a total time of 32 minutes on 01/23/24 in caring for this patient including greater than 50% of this time was spent in counseling/coordination of care as listed above.       Discussed with the patient and all questioned fully answered. She will contact me with concerns.    Bety Montgomery

## 2024-01-23 NOTE — ASSESSMENT & PLAN NOTE
Will need a repeat DXA in 10/2025.    Advised to continue calcium 1000mg daily via diet plus vit D3 2000IU daily plus exercise.

## 2024-01-23 NOTE — ASSESSMENT & PLAN NOTE
She is clinically and biochemically euthyroid.  Recent TSH and fT4 were normal. fT4 by equilibrium dialysis correlated to regular fT4.

## 2024-01-23 NOTE — ASSESSMENT & PLAN NOTE
ASCVD risk score is 21%. She should be on a high potency statin therapy based on ACC 2018 guideline.  Last LDL-c was 187mg/dL and non HDL was 201mg/dL. Lipoprotein A was normal.

## 2024-02-01 ENCOUNTER — APPOINTMENT (OUTPATIENT)
Dept: LAB | Facility: CLINIC | Age: 79
End: 2024-02-01
Payer: COMMERCIAL

## 2024-02-01 DIAGNOSIS — E78.2 MIXED HYPERLIPIDEMIA: ICD-10-CM

## 2024-02-01 LAB
CHOLEST SERPL-MCNC: 217 MG/DL
HDLC SERPL-MCNC: 85 MG/DL
LDLC SERPL CALC-MCNC: 96 MG/DL (ref 0–100)
NONHDLC SERPL-MCNC: 132 MG/DL
TRIGL SERPL-MCNC: 181 MG/DL

## 2024-02-01 PROCEDURE — 36415 COLL VENOUS BLD VENIPUNCTURE: CPT

## 2024-02-01 PROCEDURE — 80061 LIPID PANEL: CPT

## 2024-04-03 ENCOUNTER — TELEPHONE (OUTPATIENT)
Dept: CARDIOLOGY CLINIC | Facility: CLINIC | Age: 79
End: 2024-04-03

## 2024-04-03 DIAGNOSIS — I49.1 PAC (PREMATURE ATRIAL CONTRACTION): Primary | ICD-10-CM

## 2024-04-03 NOTE — TELEPHONE ENCOUNTER
----- Message from Vinny Sosa MD sent at 4/2/2024  4:44 PM EDT -----  Thx  We can obtain one week monitor. Is there ph# to Dr. Moon?       ----- Message -----  From: Callie Caro  Sent: 4/1/2024  12:01 PM EDT  To: Vinny Sosa MD    Hi Dr. Sosa,    Received a call from Dr. Moon asking if you would be okay to place an order a repeat heart monitor to check for patient's PAC burden. They ran a 30-sec strip during patient's colonoscopy where she only had 3 PAC beats.     Last monitor was done on 8/2023 with a PAC burden of 24.6%.    Thanks.

## 2024-04-03 NOTE — TELEPHONE ENCOUNTER
Per [employer] online precert resource list, auth is not required for both a 1W ZIO [id number] or a 2W ZIO [id number].

## 2024-04-11 NOTE — TELEPHONE ENCOUNTER
Called patient to set-up one week zio monitor. Patient prefers to have monitor mailed. Verified patient's shipping information.    Patient was asking why only a one week monitor was ordered? She previously had two week monitors done.    Please advise. Thanks.

## 2024-05-02 ENCOUNTER — CLINICAL SUPPORT (OUTPATIENT)
Dept: CARDIOLOGY CLINIC | Facility: CLINIC | Age: 79
End: 2024-05-02
Payer: COMMERCIAL

## 2024-05-02 DIAGNOSIS — I49.1 PAC (PREMATURE ATRIAL CONTRACTION): ICD-10-CM

## 2024-05-02 PROCEDURE — 93244 EXT ECG>48HR<7D REV&INTERPJ: CPT | Performed by: INTERNAL MEDICINE

## 2024-05-03 ENCOUNTER — TELEPHONE (OUTPATIENT)
Dept: CARDIOLOGY CLINIC | Facility: CLINIC | Age: 79
End: 2024-05-03

## 2024-05-03 DIAGNOSIS — I48.91 ATRIAL FIBRILLATION, UNSPECIFIED TYPE (HCC): Primary | ICD-10-CM

## 2024-05-03 DIAGNOSIS — R06.09 DYSPNEA ON EXERTION: ICD-10-CM

## 2024-05-03 RX ORDER — DILTIAZEM HYDROCHLORIDE 120 MG/1
120 CAPSULE, EXTENDED RELEASE ORAL 2 TIMES DAILY
Qty: 60 CAPSULE | Refills: 3 | Status: SHIPPED | OUTPATIENT
Start: 2024-05-03

## 2024-05-03 NOTE — TELEPHONE ENCOUNTER
[employer] called to state that pt went into rapid afib on 4/18 at 11:52 am with a pulse of 175 bpm lasting for 60 sec. This can be found on page 15 strip 8.

## 2024-05-03 NOTE — TELEPHONE ENCOUNTER
Returned call to pt. Reviewed your message w/ her. She's asking about starting a med like Metoprolol to slow down the HR instead of being on a blood thinner right away.     Also she uses the [facility] on [address] for meds.    Please call her back to discuss.

## 2024-05-03 NOTE — PROGRESS NOTES
Patient is having palpitations and dyspnea on exertion    Cardiac monitor showed atrial fibrillation with RVR    Will obtain stress echo to rule out CAD as she has risk factors    Will start diltiazem 120 mg daily and Eliquis 5 mg twice daily    Spoke to patient and she is agreeable.

## 2024-05-03 NOTE — TELEPHONE ENCOUNTER
Vinny Sosa MD      atient had a min HR of 53 bpm, max HR of 181 bpm, and avg HR of 79 bpm. Predominant underlying rhythm was Sinus Rhythm. 1 run of Ventricular Tachycardia occurred lasting 7 beats with a max rate of 132 bpm (avg 119 bpm). 40 Supraventricular Tachycardia runs occurred, the run with the fastest interval lasting 5 beats with a max rate of 174 bpm, the longest lasting 9 beats with an avg rate of 108 bpm. Some episodes of Supraventricular Tachycardia may be possible Atrial Tachycardia with variable block. Atrial Fibrillation occurred (6% burden), ranging from  bpm (avg of 116 bpm), the longest lasting 9 hours 7 mins with an avg rate of 116 bpm. Isolated SVEs were frequent (39.3%, 915179), SVE Couplets were rare (<1.0%, 554), and SVE Triplets were rare (<1.0%, 1122). Isolated VEs were occasional (1.2%, 9165), VE Couplets were rare (<1.0%, 180), and no VE Triplets were present. Ventricular Bigeminy and Trigeminy were present. MD notification criteria for Rapid Atrial Fibrillation met - report posted prior to notification per account request (TD).    Called patient and left a voicemail to call us back.    She needs to start anticoagulation.  We can start Eliquis 5 mg twice daily.    EP team-can you please try to reach her later today?  Thanks

## 2024-05-03 NOTE — TELEPHONE ENCOUNTER
Caller: patient    Doctor: Ian    Reason for call: patient states that Dr. Ferrara reviewed her heart monitor results and mentioned changing her medications. Patient wants to speak to a nurse in regard to this. Please advice.    Call back#: [phone]

## 2024-05-03 NOTE — TELEPHONE ENCOUNTER
I called patient. She completely understands need for Eliquis and is willing to take this.  However, she has further questions about the amount of afib she is having and if a BB is necessary given the high heart rate when in afib.  She would like to speak with Dr. Sosa or schedule a soon office appointment.      I will check with scheduling to see when Dr. Sosa's next available appointment is.  I will place order for Eliquis. I did explain that may not be signed off on until Monday,  however.      She will wait to hear from Dr. Sosa to answer other questions related to the afib.

## 2024-05-15 ENCOUNTER — TELEPHONE (OUTPATIENT)
Dept: CARDIOLOGY CLINIC | Facility: CLINIC | Age: 79
End: 2024-05-15

## 2024-05-15 NOTE — TELEPHONE ENCOUNTER
Patient called with some questions regarding her upcomming stress test and her medications. The first question is for the PA's:    Patient has had extreme myalgia in her lower extremities that started about 2 days after starting her cardiac meds prescribed by Dr. Sosa. As far as prescribed meds, patient is only on diltiazem, eliquis, and xanax for sleep. She is NOT on the statin or her thyroid med. She does take OTC supplements that are in her chart. She would like to know what can be done regarding this because this pain does not let her sleep at night. She would like to know if this could be a side effect of her eliquis or her cardizem? Please advise.         As for questions regarding the actual test (documentation):    Patient was called by [facility] (?) and they stated that if she had questions regarding her upcomming procedure to call our office. I confirmed with Lisbeth from clerical over at vascular and she stated that it is possibly [facility], which is a portal used for insurance [employer] for the stress tests and that they often call patients. I will communicate this to her when she calls back and let her know nothing additional is needed regarding this.   She also wanted to know if she had to hold any meds. Martha over at vascular confirmed that diltiazem and eliquis do not have to be held for a stress test and that any meds that need to be stopped are communicated by the provider prior to scheduling. She also had a question as to whether she will be getting an injection during the test and Martha confirmed that patients only get injected when indicated and that sometimes depending on the image quality and reading physician's recommendations they can get an IV that is explained in detail during the exam and is communicated to the patient prior to.

## 2024-05-15 NOTE — TELEPHONE ENCOUNTER
Spoke to patient and communicated the information regarding her echo. She verbalized understanding and will wait until I hear back from a provider regarding her myalgia.

## 2024-05-16 DIAGNOSIS — I48.91 ATRIAL FIBRILLATION, UNSPECIFIED TYPE (HCC): ICD-10-CM

## 2024-05-16 DIAGNOSIS — M79.10 MYALGIA: Primary | ICD-10-CM

## 2024-05-16 DIAGNOSIS — I49.1 PAC (PREMATURE ATRIAL CONTRACTION): ICD-10-CM

## 2024-05-16 NOTE — TELEPHONE ENCOUNTER
Spoke to patient, she agrees to stay on the meds and will get the bloodwork tomorrow 5/17. She states that her myalgia is getting slightly better. No further questions at this time.

## 2024-05-17 ENCOUNTER — APPOINTMENT (OUTPATIENT)
Dept: LAB | Facility: CLINIC | Age: 79
End: 2024-05-17
Payer: COMMERCIAL

## 2024-05-17 DIAGNOSIS — M79.10 MYALGIA: ICD-10-CM

## 2024-05-17 DIAGNOSIS — I49.1 PAC (PREMATURE ATRIAL CONTRACTION): ICD-10-CM

## 2024-05-17 DIAGNOSIS — I48.91 ATRIAL FIBRILLATION, UNSPECIFIED TYPE (HCC): ICD-10-CM

## 2024-05-17 LAB
ALBUMIN SERPL BCP-MCNC: 4.4 G/DL (ref 3.5–5)
ALP SERPL-CCNC: 69 U/L (ref 34–104)
ALT SERPL W P-5'-P-CCNC: 12 U/L (ref 7–52)
ANION GAP SERPL CALCULATED.3IONS-SCNC: 8 MMOL/L (ref 4–13)
AST SERPL W P-5'-P-CCNC: 17 U/L (ref 13–39)
BASOPHILS # BLD AUTO: 0.04 THOUSANDS/ÂΜL (ref 0–0.1)
BASOPHILS NFR BLD AUTO: 1 % (ref 0–1)
BILIRUB SERPL-MCNC: 0.9 MG/DL (ref 0.2–1)
BUN SERPL-MCNC: 13 MG/DL (ref 5–25)
CALCIUM SERPL-MCNC: 9.7 MG/DL (ref 8.4–10.2)
CHLORIDE SERPL-SCNC: 102 MMOL/L (ref 96–108)
CK SERPL-CCNC: 35 U/L (ref 26–192)
CO2 SERPL-SCNC: 31 MMOL/L (ref 21–32)
CREAT SERPL-MCNC: 0.65 MG/DL (ref 0.6–1.3)
CRP SERPL QL: 2.4 MG/L
EOSINOPHIL # BLD AUTO: 0.21 THOUSAND/ÂΜL (ref 0–0.61)
EOSINOPHIL NFR BLD AUTO: 5 % (ref 0–6)
ERYTHROCYTE [DISTWIDTH] IN BLOOD BY AUTOMATED COUNT: 12.4 % (ref 11.6–15.1)
GFR SERPL CREATININE-BSD FRML MDRD: 85 ML/MIN/1.73SQ M
GLUCOSE P FAST SERPL-MCNC: 93 MG/DL (ref 65–99)
HCT VFR BLD AUTO: 41.6 % (ref 34.8–46.1)
HGB BLD-MCNC: 13.4 G/DL (ref 11.5–15.4)
IMM GRANULOCYTES # BLD AUTO: 0.02 THOUSAND/UL (ref 0–0.2)
IMM GRANULOCYTES NFR BLD AUTO: 0 % (ref 0–2)
LYMPHOCYTES # BLD AUTO: 1.93 THOUSANDS/ÂΜL (ref 0.6–4.47)
LYMPHOCYTES NFR BLD AUTO: 43 % (ref 14–44)
MCH RBC QN AUTO: 31.9 PG (ref 26.8–34.3)
MCHC RBC AUTO-ENTMCNC: 32.2 G/DL (ref 31.4–37.4)
MCV RBC AUTO: 99 FL (ref 82–98)
MONOCYTES # BLD AUTO: 0.3 THOUSAND/ÂΜL (ref 0.17–1.22)
MONOCYTES NFR BLD AUTO: 7 % (ref 4–12)
NEUTROPHILS # BLD AUTO: 1.98 THOUSANDS/ÂΜL (ref 1.85–7.62)
NEUTS SEG NFR BLD AUTO: 44 % (ref 43–75)
NRBC BLD AUTO-RTO: 0 /100 WBCS
PLATELET # BLD AUTO: 241 THOUSANDS/UL (ref 149–390)
PMV BLD AUTO: 10 FL (ref 8.9–12.7)
POTASSIUM SERPL-SCNC: 4.5 MMOL/L (ref 3.5–5.3)
PROT SERPL-MCNC: 7.2 G/DL (ref 6.4–8.4)
RBC # BLD AUTO: 4.2 MILLION/UL (ref 3.81–5.12)
SODIUM SERPL-SCNC: 141 MMOL/L (ref 135–147)
WBC # BLD AUTO: 4.48 THOUSAND/UL (ref 4.31–10.16)

## 2024-05-17 PROCEDURE — 80053 COMPREHEN METABOLIC PANEL: CPT

## 2024-05-17 PROCEDURE — 36415 COLL VENOUS BLD VENIPUNCTURE: CPT

## 2024-05-17 PROCEDURE — 86140 C-REACTIVE PROTEIN: CPT

## 2024-05-17 PROCEDURE — 85025 COMPLETE CBC W/AUTO DIFF WBC: CPT

## 2024-05-17 PROCEDURE — 82550 ASSAY OF CK (CPK): CPT

## 2024-05-30 DIAGNOSIS — I48.91 ATRIAL FIBRILLATION, UNSPECIFIED TYPE (HCC): ICD-10-CM

## 2024-05-31 RX ORDER — DILTIAZEM HYDROCHLORIDE 120 MG/1
120 CAPSULE, EXTENDED RELEASE ORAL 2 TIMES DAILY
Qty: 180 CAPSULE | Refills: 1 | Status: SHIPPED | OUTPATIENT
Start: 2024-05-31

## 2024-06-10 ENCOUNTER — APPOINTMENT (OUTPATIENT)
Dept: LAB | Facility: CLINIC | Age: 79
End: 2024-06-10
Payer: COMMERCIAL

## 2024-06-10 ENCOUNTER — TRANSCRIBE ORDERS (OUTPATIENT)
Dept: LAB | Facility: CLINIC | Age: 79
End: 2024-06-10

## 2024-06-10 DIAGNOSIS — E78.5 HYPERLIPIDEMIA, UNSPECIFIED HYPERLIPIDEMIA TYPE: Primary | ICD-10-CM

## 2024-06-10 DIAGNOSIS — E78.5 HYPERLIPIDEMIA, UNSPECIFIED HYPERLIPIDEMIA TYPE: ICD-10-CM

## 2024-06-10 LAB
CHOLEST SERPL-MCNC: 225 MG/DL
HDLC SERPL-MCNC: 71 MG/DL
LDLC SERPL CALC-MCNC: 115 MG/DL (ref 0–100)
NONHDLC SERPL-MCNC: 154 MG/DL
TRIGL SERPL-MCNC: 194 MG/DL

## 2024-06-10 PROCEDURE — 80061 LIPID PANEL: CPT

## 2024-06-10 PROCEDURE — 36415 COLL VENOUS BLD VENIPUNCTURE: CPT

## 2024-06-24 ENCOUNTER — OFFICE VISIT (OUTPATIENT)
Dept: CARDIOLOGY CLINIC | Facility: CLINIC | Age: 79
End: 2024-06-24
Payer: COMMERCIAL

## 2024-06-24 VITALS
HEART RATE: 66 BPM | SYSTOLIC BLOOD PRESSURE: 142 MMHG | BODY MASS INDEX: 27.29 KG/M2 | WEIGHT: 148.3 LBS | HEIGHT: 62 IN | DIASTOLIC BLOOD PRESSURE: 78 MMHG

## 2024-06-24 DIAGNOSIS — I48.91 ATRIAL FIBRILLATION, UNSPECIFIED TYPE (HCC): Primary | ICD-10-CM

## 2024-06-24 PROCEDURE — 99215 OFFICE O/P EST HI 40 MIN: CPT | Performed by: INTERNAL MEDICINE

## 2024-06-24 NOTE — PATIENT INSTRUCTIONS
Call scheduling number [phone] and set-up the stress test at your convenient time and location    Hold diltiazem for 2 days prior to the stress test    Continue Eliquis    Let me know about loop  monitor placement

## 2024-06-24 NOTE — PROGRESS NOTES
EPS Follow-up Patient Evaluation - Milka Johnson 79 y.o. female MRN: [id number]       Referring: Dr. Conteh    CC/HPI:   It was a pleasure to see Milka Johnson in our arrhythmia clinic at [facility]. As you know she is a 79 y.o. woman with arthritis, PTSD who presented 7/20/23 to discuss management of PVCs.    Patient had Holter monitor which revealed occasional PACs with 1.3% burden and rare PVCs.  2-week cardiac event monitor was performed which revealed 11% PAC burden.  She also had short runs of atrial tachycardia.  Echocardiogram was normal and she was recommended to stop caffeine.  She declined trial of beta-blocker and preferred to discuss further management with electrophysiology.    She presented with her friend who is an ER physician and has training in surgery. Patient reported having palpitations and at times dizziness.  She does not feel palpitation all the time however only when her heart is racing.  She has noted heart racing up to 120 bpm.  She has cut down on her caffeine intake and consumes a vegan diet.  She will also decrease her alcohol intake as well.  She denies any swelling in lower extremity, orthopnea, PND or syncope.    Office visit 10/30/2023:    She had cardiac event monitor performed which showed AT episodes with longest episode 20 bpm, and fastest episode at 200 bpm, for 13 beats. She was recommended to start diltiazem.     She has seen multiple endocrinologist and they have tapered her of levothyroxine. She has been off of it for one month now. She notices palpitations (rapid heart beats) are less frequent now (2-3 times/day), lasting less than minutes.     Interval history:  Milka presents for a follow-up visit.  She had event monitor which revealed atrial fibrillation episode however it was brief.  She was started on diltiazem and anticoagulation.  After starting diltiazem she has felt better in terms of her palpitations.  She denies any chest  pain but did have dyspnea on exertion with atrial fibrillation.  Stress test was ordered which she has not completed yet.  She denies any swelling in lower extremities.    Past Medical History:  Past Medical History:   Diagnosis Date    Arthritis     PTSD (post-traumatic stress disorder)        Medications:      Current Outpatient Medications:     ALPRAZolam (XANAX) 0.5 mg tablet, TAKE 1 TABLET EVERY 8 HOURS AS NEEDED, Disp: , Rfl: 5    apixaban (Eliquis) 5 mg, Take 1 tablet (5 mg total) by mouth 2 (two) times a day, Disp: 60 tablet, Rfl: 3    Cholecalciferol (Vitamin D3) 125 MCG (5000 UT) TABS, Take 5,000 Units by mouth daily, Disp: , Rfl:     diltiazem (CARDIZEM SR) 120 mg 12 hr capsule, TAKE 1 CAPSULE BY MOUTH 2 TIMES A DAY., Disp: 180 capsule, Rfl: 1    Multiple Vitamin (THERAGRAN PO), Take by mouth, Disp: , Rfl:     Specialty Vitamins Products (HAIR BOOSTER PO), Take by mouth, Disp: , Rfl:     TURMERIC CURCUMIN PO, Take by mouth, Disp: , Rfl:     atorvastatin (LIPITOR) 20 mg tablet, Take 1 tablet (20 mg total) by mouth daily, Disp: 90 tablet, Rfl: 0    bisacodyl (DULCOLAX) 5 mg EC tablet, Take 2 tablets (10 mg total) by mouth once for 1 dose (Patient not taking: Reported on 1/23/2024), Disp: 2 tablet, Rfl: 0    levothyroxine (Levoxyl) 25 mcg tablet, Take 1.5tabs daily (Patient not taking: Reported on 10/23/2023), Disp: 100 tablet, Rfl: 0    polyethylene glycol (GOLYTELY) 4000 mL solution, Take 4,000 mL by mouth once for 1 dose, Disp: 4000 mL, Rfl: 0     Family History   Problem Relation Age of Onset    Heart disease Mother     Stroke Mother     Lung disease Father         black lung disease    Heart attack Father         age 62     Social History     Socioeconomic History    Marital status:      Spouse name: Not on file    Number of children: Not on file    Years of education: Not on file    Highest education level: Not on file   Occupational History    Occupation: Retired Professor   Tobacco Use     "Smoking status: Former     Current packs/day: 0.00     Types: Cigarettes     Quit date: 1977     Years since quittin.8    Smokeless tobacco: Never   Substance and Sexual Activity    Alcohol use: Not Currently    Drug use: No    Sexual activity: Not Currently     Partners: Male   Other Topics Concern    Not on file   Social History Narrative    Not on file     Social Determinants of Health     Financial Resource Strain: Not on file   Food Insecurity: Not on file   Transportation Needs: Not on file   Physical Activity: Not on file   Stress: Not on file   Social Connections: Not on file   Intimate Partner Violence: Not on file   Housing Stability: Not on file     Social History     Tobacco Use   Smoking Status Former    Current packs/day: 0.00    Types: Cigarettes    Quit date: 1977    Years since quittin.8   Smokeless Tobacco Never     Social History     Substance and Sexual Activity   Alcohol Use Not Currently       Review of Systems   Constitutional: Negative for chills and fever.   HENT: Negative.     Eyes:  Negative for blurred vision and double vision.   Cardiovascular:  Positive for dyspnea on exertion and palpitations. Negative for chest pain, leg swelling, near-syncope, orthopnea, paroxysmal nocturnal dyspnea and syncope.   Respiratory:  Negative for cough and sputum production.    Endocrine: Negative.    Skin: Negative.  Negative for rash.   Musculoskeletal: Negative.  Negative for arthritis and joint pain.   Gastrointestinal:  Negative for abdominal pain, nausea and vomiting.   Genitourinary: Negative.    Neurological:  Negative for dizziness and light-headedness.   Psychiatric/Behavioral: Negative.  The patient is not nervous/anxious.         Objective:     Vitals: Blood pressure 142/78, pulse 66, height 5' 2\" (1.575 m), weight 67.3 kg (148 lb 4.8 oz)., Body mass index is 27.12 kg/m².,        Physical Exam:    GEN: Milka Johnson appears well, alert and oriented x 3, pleasant and " cooperative   HEENT: pupils equal, round, and reactive to light; extraocular muscles intact  NECK: supple, no carotid bruits   HEART: regular rhythm with ectopic beats, normal S1 and S2, no murmurs, clicks, gallops or rubs   LUNGS: clear to auscultation bilaterally; no wheezes, rales, or rhonchi   ABDOMEN: normal bowel sounds, soft, no tenderness, no distention  EXTREMITIES: peripheral pulses normal; no clubbing, cyanosis, or edema  NEURO: no focal findings   SKIN: normal without suspicious lesions on exposed skin      Labs & Results:  Below is the patient's most recent value for Albumin, ALT, AST, BUN, Calcium, Chloride, Cholesterol, CO2, Creatinine, GFR, Glucose, HDL, Hematocrit, Hemoglobin, Hemoglobin A1C, LDL, Magnesium, Phosphorus, Platelets, Potassium, PSA, Sodium, Triglycerides, and WBC.   Lab Results   Component Value Date    ALT 12 05/17/2024    AST 17 05/17/2024    BUN 13 05/17/2024    CALCIUM 9.7 05/17/2024     05/17/2024    CO2 31 05/17/2024    CREATININE 0.65 05/17/2024    HDL 71 06/10/2024    HCT 41.6 05/17/2024    HGB 13.4 05/17/2024    HGBA1C 5.2 05/31/2019    MG 2.0 03/30/2023     05/17/2024    K 4.5 05/17/2024    TRIG 194 (H) 06/10/2024    WBC 4.48 05/17/2024     Note: for a comprehensive list of the patient's lab results, access the Results Review activity.          Cardiac testing:     I personally reviewed the ECG performed in the clinic on 06/24/24. It reveals normal sinus rhythm with atrial bigeminy.     Echocardiograms:  No results found for this or any previous visit.    No results found for this or any previous visit.      Catheterizations:   No results found for this or any previous visit.      Stress Tests:  No results found for this or any previous visit.      Holter monitor -   No results found for this or any previous visit.    No results found for this or any previous visit.        ASSESSMENT/PLAN:      1.  Paroxysmal atrial fibrillation  Recent cardiac event monitor  revealed 11% burden  Beta-blocker was offered but patient declined  Currently EKG shows atrial bigeminy  She occasionally feels palpitations and dizziness  We discussed neck steps in management including AV marla blocking agents versus antiarrhythmic therapy  Prescribed diltiazem 120 mg daily  Event monitor showed AT runs.   Patient advised to start diltiazem but has not take it.   Levothyroxine discontinued by endocrinologist.   Patient notes frequency of episodes has decreased significantly.   Repeat cardiac event monitor revealed atrial fibrillation episode.    Started on diltiazem and [employer]  Felt improvement in her palpitations after starting diltiazem  Recommend loop monitor to assess long-term burden but patient would like to think about it; Patient is nervous about having any procedures.   Will obtain echocardiogram with stress echo    2.  Hypertension  Normotensive in the office  Patient was offered lisinopril in the past but she self discontinued  She will check her BP at home on daily basis and keep a log of it    3.  Hyperlipidemia  Elevated LDL levels  Further management per primary cardiologist    4. Hypothyroid  Patient was on levothyroxine,  discontinued by endocrinologist  Repeat testing planned for January 5.  Dyspnea on exertion  Given patient has risk factors including high blood pressure and hyperlipidemia will obtain stress echocardiogram    Follow-up in 6 months

## 2024-06-25 PROCEDURE — 93000 ELECTROCARDIOGRAM COMPLETE: CPT | Performed by: INTERNAL MEDICINE

## 2024-07-03 ENCOUNTER — APPOINTMENT (OUTPATIENT)
Dept: LAB | Facility: CLINIC | Age: 79
End: 2024-07-03
Payer: COMMERCIAL

## 2024-07-03 ENCOUNTER — TRANSCRIBE ORDERS (OUTPATIENT)
Dept: LAB | Facility: CLINIC | Age: 79
End: 2024-07-03

## 2024-07-03 DIAGNOSIS — E78.5 HYPERLIPIDEMIA, UNSPECIFIED HYPERLIPIDEMIA TYPE: ICD-10-CM

## 2024-07-03 DIAGNOSIS — I51.9 MYXEDEMA HEART DISEASE: Primary | ICD-10-CM

## 2024-07-03 DIAGNOSIS — E03.9 MYXEDEMA HEART DISEASE: Primary | ICD-10-CM

## 2024-07-03 DIAGNOSIS — I51.9 MYXEDEMA HEART DISEASE: ICD-10-CM

## 2024-07-03 DIAGNOSIS — E03.9 MYXEDEMA HEART DISEASE: ICD-10-CM

## 2024-07-03 LAB
LDLC SERPL DIRECT ASSAY-MCNC: 128 MG/DL (ref 0–100)
T4 FREE SERPL-MCNC: 0.6 NG/DL (ref 0.61–1.12)
TSH SERPL DL<=0.05 MIU/L-ACNC: 2.9 UIU/ML (ref 0.45–4.5)

## 2024-07-03 PROCEDURE — 83721 ASSAY OF BLOOD LIPOPROTEIN: CPT

## 2024-07-03 PROCEDURE — 84443 ASSAY THYROID STIM HORMONE: CPT

## 2024-07-03 PROCEDURE — 84439 ASSAY OF FREE THYROXINE: CPT

## 2024-07-03 PROCEDURE — 36415 COLL VENOUS BLD VENIPUNCTURE: CPT

## 2024-07-10 ENCOUNTER — HOSPITAL ENCOUNTER (OUTPATIENT)
Dept: RADIOLOGY | Facility: HOSPITAL | Age: 79
Discharge: HOME | End: 2024-07-10
Payer: COMMERCIAL

## 2024-07-10 DIAGNOSIS — M25.552 BILATERAL HIP PAIN: ICD-10-CM

## 2024-07-10 DIAGNOSIS — M25.551 BILATERAL HIP PAIN: ICD-10-CM

## 2024-07-10 PROCEDURE — 73522 X-RAY EXAM HIPS BI 3-4 VIEWS: CPT

## 2024-08-28 ENCOUNTER — TRANSCRIBE ORDERS (OUTPATIENT)
Dept: LAB | Facility: CLINIC | Age: 79
End: 2024-08-28

## 2024-08-28 ENCOUNTER — APPOINTMENT (OUTPATIENT)
Dept: LAB | Facility: CLINIC | Age: 79
End: 2024-08-28
Payer: COMMERCIAL

## 2024-08-28 ENCOUNTER — TELEPHONE (OUTPATIENT)
Age: 79
End: 2024-08-28

## 2024-08-28 DIAGNOSIS — I48.91 ATRIAL FIBRILLATION, UNSPECIFIED TYPE (HCC): ICD-10-CM

## 2024-08-28 DIAGNOSIS — E03.9 HYPOTHYROIDISM, UNSPECIFIED TYPE: ICD-10-CM

## 2024-08-28 DIAGNOSIS — E83.42 HYPOMAGNESEMIA: ICD-10-CM

## 2024-08-28 DIAGNOSIS — E83.42 HYPOMAGNESEMIA: Primary | ICD-10-CM

## 2024-08-28 LAB — MAGNESIUM SERPL-MCNC: 1.9 MG/DL (ref 1.9–2.7)

## 2024-08-28 PROCEDURE — 83735 ASSAY OF MAGNESIUM: CPT

## 2024-08-28 RX ORDER — DILTIAZEM HYDROCHLORIDE 240 MG/1
240 CAPSULE, EXTENDED RELEASE ORAL DAILY
Qty: 60 CAPSULE | Refills: 3 | Status: SHIPPED | OUTPATIENT
Start: 2024-08-28

## 2024-08-28 NOTE — TELEPHONE ENCOUNTER
She frequently forgets both doses and wants to take just a daily medication (diltiazem).  So would you like me to send in diltiazem ER 240mg daily for her?

## 2024-08-28 NOTE — TELEPHONE ENCOUNTER
Patient is requesting a new medication. Patient currently takes Diltiazem 120mg twice daily. She said she previously spoke to Dr Sosa about changing that to a once daily medication. Please review and advise.   Patient uses [facility]/pharmacy #[id number]  [place], [place] - [address] [phone]

## 2024-08-28 NOTE — TELEPHONE ENCOUNTER
Please advise if ok to decrease. I didn't see anything documented about this in the last OV note.

## 2024-08-28 NOTE — TELEPHONE ENCOUNTER
Reason for call:   [x] Refill   [] Prior Auth  [] Other:     Office:   [] PCP/Provider -   [x] Specialty/Provider - cardio    Medication: Eliquis 5mg    Dose/Frequency: 1 tab bid    Quantity: 60    Pharmacy: [facility]/pharmacy #[id number] - [place], [place] - [address] [phone]     Does the patient have enough for 3 days?   [x] Yes   [] No - Send as HP to POD

## 2024-11-14 DIAGNOSIS — I48.91 ATRIAL FIBRILLATION, UNSPECIFIED TYPE (HCC): ICD-10-CM

## 2024-11-14 RX ORDER — DILTIAZEM HYDROCHLORIDE 240 MG/1
240 CAPSULE, EXTENDED RELEASE ORAL DAILY
Qty: 60 CAPSULE | Refills: 3 | Status: SHIPPED | OUTPATIENT
Start: 2024-11-14

## 2025-01-06 DIAGNOSIS — I48.91 ATRIAL FIBRILLATION, UNSPECIFIED TYPE (HCC): ICD-10-CM

## 2025-01-07 ENCOUNTER — LAB REQUISITION (OUTPATIENT)
Dept: LAB | Facility: HOSPITAL | Age: 80
End: 2025-01-07
Payer: COMMERCIAL

## 2025-01-07 DIAGNOSIS — H18.513 ENDOTHELIAL CORNEAL DYSTROPHY, BILATERAL: ICD-10-CM

## 2025-01-07 PROCEDURE — 87102 FUNGUS ISOLATION CULTURE: CPT | Performed by: OPHTHALMOLOGY

## 2025-01-07 PROCEDURE — 87070 CULTURE OTHR SPECIMN AEROBIC: CPT | Performed by: OPHTHALMOLOGY

## 2025-01-07 PROCEDURE — 87205 SMEAR GRAM STAIN: CPT | Performed by: OPHTHALMOLOGY

## 2025-01-07 PROCEDURE — 87075 CULTR BACTERIA EXCEPT BLOOD: CPT | Performed by: OPHTHALMOLOGY

## 2025-01-08 DIAGNOSIS — I48.91 ATRIAL FIBRILLATION, UNSPECIFIED TYPE (HCC): ICD-10-CM

## 2025-01-08 RX ORDER — DILTIAZEM HYDROCHLORIDE 240 MG/1
240 CAPSULE, EXTENDED RELEASE ORAL DAILY
Qty: 90 CAPSULE | Refills: 1 | OUTPATIENT
Start: 2025-01-08

## 2025-01-08 RX ORDER — DILTIAZEM HYDROCHLORIDE 240 MG/1
240 CAPSULE, EXTENDED RELEASE ORAL DAILY
Qty: 90 CAPSULE | Refills: 3 | Status: SHIPPED | OUTPATIENT
Start: 2025-01-08

## 2025-01-10 LAB
BACTERIA EYE AEROBE CULT: NO GROWTH
BACTERIA SPEC ANAEROBE CULT: NO GROWTH
GRAM STN SPEC: NORMAL

## 2025-01-13 LAB — FUNGUS SPEC CULT: NORMAL

## 2025-01-20 LAB — FUNGUS SPEC CULT: NORMAL

## 2025-01-27 LAB — FUNGUS SPEC CULT: NORMAL

## 2025-02-03 ENCOUNTER — APPOINTMENT (OUTPATIENT)
Dept: LAB | Facility: CLINIC | Age: 80
End: 2025-02-03
Payer: COMMERCIAL

## 2025-02-03 ENCOUNTER — TRANSCRIBE ORDERS (OUTPATIENT)
Dept: LAB | Facility: CLINIC | Age: 80
End: 2025-02-03

## 2025-02-03 DIAGNOSIS — E78.5 HYPERLIPIDEMIA, UNSPECIFIED HYPERLIPIDEMIA TYPE: ICD-10-CM

## 2025-02-03 DIAGNOSIS — E78.5 HYPERLIPIDEMIA, UNSPECIFIED HYPERLIPIDEMIA TYPE: Primary | ICD-10-CM

## 2025-02-03 DIAGNOSIS — E03.9 HYPOTHYROIDISM, UNSPECIFIED TYPE: ICD-10-CM

## 2025-02-03 LAB
ALBUMIN SERPL BCG-MCNC: 4.1 G/DL (ref 3.5–5)
ALP SERPL-CCNC: 67 U/L (ref 34–104)
ALT SERPL W P-5'-P-CCNC: 8 U/L (ref 7–52)
ANION GAP SERPL CALCULATED.3IONS-SCNC: 7 MMOL/L (ref 4–13)
AST SERPL W P-5'-P-CCNC: 16 U/L (ref 13–39)
BILIRUB SERPL-MCNC: 0.69 MG/DL (ref 0.2–1)
BUN SERPL-MCNC: 11 MG/DL (ref 5–25)
CALCIUM SERPL-MCNC: 9.3 MG/DL (ref 8.4–10.2)
CHLORIDE SERPL-SCNC: 104 MMOL/L (ref 96–108)
CHOLEST SERPL-MCNC: 221 MG/DL (ref ?–200)
CO2 SERPL-SCNC: 30 MMOL/L (ref 21–32)
CREAT SERPL-MCNC: 0.67 MG/DL (ref 0.6–1.3)
CRP SERPL HS-MCNC: 2 MG/L
ERYTHROCYTE [DISTWIDTH] IN BLOOD BY AUTOMATED COUNT: 12.6 % (ref 11.6–15.1)
FUNGUS SPEC CULT: NORMAL
GFR SERPL CREATININE-BSD FRML MDRD: 85 ML/MIN/1.73SQ M
GLUCOSE P FAST SERPL-MCNC: 91 MG/DL (ref 65–99)
HCT VFR BLD AUTO: 40.7 % (ref 34.8–46.1)
HDLC SERPL-MCNC: 74 MG/DL
HGB BLD-MCNC: 13.1 G/DL (ref 11.5–15.4)
LDLC SERPL CALC-MCNC: 109 MG/DL (ref 0–100)
MAGNESIUM SERPL-MCNC: 2.1 MG/DL (ref 1.9–2.7)
MCH RBC QN AUTO: 31.6 PG (ref 26.8–34.3)
MCHC RBC AUTO-ENTMCNC: 32.2 G/DL (ref 31.4–37.4)
MCV RBC AUTO: 98 FL (ref 82–98)
NONHDLC SERPL-MCNC: 147 MG/DL
PLATELET # BLD AUTO: 219 THOUSANDS/UL (ref 149–390)
PMV BLD AUTO: 10.6 FL (ref 8.9–12.7)
POTASSIUM SERPL-SCNC: 4.2 MMOL/L (ref 3.5–5.3)
PROT SERPL-MCNC: 6.3 G/DL (ref 6.4–8.4)
RBC # BLD AUTO: 4.15 MILLION/UL (ref 3.81–5.12)
SODIUM SERPL-SCNC: 141 MMOL/L (ref 135–147)
TRIGL SERPL-MCNC: 189 MG/DL (ref ?–150)
VIT B12 SERPL-MCNC: 2418 PG/ML (ref 180–914)
WBC # BLD AUTO: 4.9 THOUSAND/UL (ref 4.31–10.16)

## 2025-02-03 PROCEDURE — 85027 COMPLETE CBC AUTOMATED: CPT

## 2025-02-03 PROCEDURE — 36415 COLL VENOUS BLD VENIPUNCTURE: CPT

## 2025-02-03 PROCEDURE — 82607 VITAMIN B-12: CPT

## 2025-02-03 PROCEDURE — 83735 ASSAY OF MAGNESIUM: CPT

## 2025-02-03 PROCEDURE — 86141 C-REACTIVE PROTEIN HS: CPT

## 2025-02-03 PROCEDURE — 80053 COMPREHEN METABOLIC PANEL: CPT

## 2025-02-03 PROCEDURE — 82172 ASSAY OF APOLIPOPROTEIN: CPT

## 2025-02-03 PROCEDURE — 80061 LIPID PANEL: CPT

## 2025-02-04 LAB
APO A-I SERPL-MCNC: 210 MG/DL (ref 116–209)
APO B SERPL-MCNC: 105 MG/DL

## 2025-02-10 LAB — FUNGUS SPEC CULT: NORMAL

## 2025-02-28 ENCOUNTER — HOSPITAL ENCOUNTER (OUTPATIENT)
Dept: RADIOLOGY | Facility: HOSPITAL | Age: 80
Discharge: HOME | End: 2025-02-28
Attending: INTERNAL MEDICINE
Payer: COMMERCIAL

## 2025-02-28 DIAGNOSIS — M16.9 HIP ARTHROSIS: ICD-10-CM

## 2025-02-28 PROCEDURE — 73522 X-RAY EXAM HIPS BI 3-4 VIEWS: CPT

## 2025-03-05 ENCOUNTER — EVALUATION (OUTPATIENT)
Dept: PHYSICAL THERAPY | Age: 80
End: 2025-03-05
Payer: COMMERCIAL

## 2025-03-05 DIAGNOSIS — M54.16 LUMBAR RADICULOPATHY: ICD-10-CM

## 2025-03-05 DIAGNOSIS — M25.552 LEFT HIP PAIN: Primary | ICD-10-CM

## 2025-03-05 PROCEDURE — 97161 PT EVAL LOW COMPLEX 20 MIN: CPT

## 2025-03-05 PROCEDURE — 97110 THERAPEUTIC EXERCISES: CPT

## 2025-03-05 NOTE — LETTER
2025    Fernando Lobato MD  [address] [place] [id number]    Patient: Milka Johnson   YOB: 1945   Date of Visit: 3/5/2025     Encounter Diagnosis     ICD-10-CM    1. Left hip pain  M25.552       2. Lumbar radiculopathy  M54.16           Dear Dr. Lobato:    Thank you for your recent referral of Milka Johnson. Please review the attached evaluation summary from Milka's recent visit.     Please verify that you agree with the plan of care by signing the attached order.     If you have any questions or concerns, please do not hesitate to call.     I sincerely appreciate the opportunity to share in the care of one of your patients and hope to have another opportunity to work with you in the near future.       Sincerely,    Shannan Grant, PT      Referring Provider:      I certify that I have read the below Plan of Care and certify the need for these services furnished under this plan of treatment while under my care.                    Fernando Lobato MD  [address] [place] [address]  Via Fax: [phone]          PT Evaluation     Today's date: 3/5/2025  Patient name: Milka Johnson  : 1945  MRN: [id number]  Referring provider: Fernando Lobato MD  Dx:   Encounter Diagnosis     ICD-10-CM    1. Left hip pain  M25.552       2. Lumbar radiculopathy  M54.16                      Assessment  Impairments: abnormal gait, abnormal or restricted ROM, activity intolerance, impaired balance, impaired physical strength, pain with function, participation limitations and activity limitations    Assessment details: Patient reports to PT with c/c chronic L LE pain. She demonstrates decreased LE strength due to pain. Decreased lumbar ROM and positive special testing implicate radiculopathy from lumbar region. Patient is so forward postured with trunk, she cannot tolerate laying prone without pillows supporting abdomen. Pain limits strength and  activity tolerance which inhibits patient from completing dressing and house tasks independently. Patient educated and demonstrates understanding of HEP. Patient is a good candidate for skilled PT to address L LE pain.     Goals  SHORT TERM:  Patient will complete phase I of HEP.   Patient will lay prone without pillows abdomen.  Patient will improve strength by 1/2 grade.     LONG TERM:   Patient will complete phase II of HEP.  Patient will show lumbar extension with moderate restriction.   Patient will improve TUG time to WFL.   Patient will improve 5xSTS time to WFL.  Patient will improve strength to return to functional activities.     Plan  Patient would benefit from: skilled physical therapy    Planned therapy interventions: balance/weight bearing training, functional ROM exercises, home exercise program, therapeutic exercise, therapeutic activities, strengthening, stretching, patient/caregiver education, neuromuscular re-education, manual therapy and joint mobilization    Frequency: 1-2x week  Duration in weeks: 8  Plan of Care beginning date: 3/5/2025  Plan of Care expiration date: 4/30/2025  Treatment plan discussed with: patient  Plan details: Patient will complete skilled PT, including HEP, therapeutic exercise, therapeutic activity, neuromuscular reduction, and manual therapy, 1-2x/week for 8 weeks to address L LE pain.     Subjective Evaluation    History of Present Illness  Mechanism of injury: Patient reports to PT with c/c L LE pain. She reports pain started insidiously several years ago. She describes pain as intermittent, sharp and burning to her lateral L hip, quad, and foot. Crossing her leg, standing >20min, walking fast, walking > 3/4 mile, walking uphill, and stairs all aggravate her pain. She is unable to put bend over to put her L sock on or pick things up from the ground. She is otherwise I with ADLs. Sitting with her leg elevated, ice and advil help relieve some pain.  She used to be  active with tennis and biking, but now she only goes to the gym, golfs, and hikes. She has a hx of L hip pain from bursitis which she reports felt the same and resolves following injections. This time she was referred for 8 weeks of PT before considering other surgical options.  Patient Goals  Patient goals for therapy: decreased pain, improved balance, increased motion, increased strength, return to sport/leisure activities and independence with ADLs/IADLs  Patient goal: get back to hiking  Pain  Current pain ratin  At best pain ratin  At worst pain rating: 10  Quality: sharp and burning  Relieving factors: ice and medications (sitting, elevation)  Aggravating factors: standing, stair climbing, walking and running    Treatments  Previous treatment: injection treatment      Objective     Active Range of Motion     Lumbar   Flexion:  WFL and with pain  Extension:  with pain Restriction level: maximal  Left lateral flexion:  with pain Restriction level: moderate  Right lateral flexion:  Restriction level: moderate  Left rotation:  with pain Restriction level: moderate  Right rotation:  with pain Restriction level: moderate    Strength/Myotome Testing     Left Hip   Planes of Motion   Flexion: 3+ (pain)  Abduction: 3+ (pain)    Right Hip   Planes of Motion   Flexion: 4-  Abduction: 4-    Left Knee   Flexion: 4-  Extension: 4-    Right Knee   Flexion: 4-  Extension: 4    Left Ankle/Foot   Dorsiflexion: 4  Plantar flexion: 4    Right Ankle/Foot   Dorsiflexion: 4  Plantar flexion: 4    Tests     Lumbar     Left   Positive crossed SLR, passive SLR and slump test.     Right   Negative crossed SLR, passive SLR and slump test.     Left Pelvic Girdle/Sacrum   Positive: active SLR test.     Right Pelvic Girdle/Sacrum   Negative: active SLR test.     Left Hip   Positive GUSTAVO and FADIR.     Right Hip   Negative GUSTAVO and FADIR.     Ambulation     Observational Gait   Gait: antalgic   Decreased walking speed and stride  length.     Functional Assessment        Comments  5xSTS: 17.63s  TU.71s     General Comments:      Lumbar Comments  Prone Lying: Patient unable to tolerate lying prone flat on tx table, needs 2 pillows under abdomen           Precautions: ***      Manuals                                                                 Neuro Re-Ed                                                                                                        Ther Ex                                                                                                                     Ther Activity                                       Gait Training                                       Modalities                                          Access Code: [email or web address]  URL: [email or web address]  Date: 2025  Prepared by: Shannan Grant    Exercises  - Standing Lumbar Extension at Wall - Forearms  - 2-3 x daily - 7 x weekly - 3 sets - 10 reps  - Standing Lumbar Extension with Counter  - 2-3 x daily - 7 x weekly - 3 sets - 10 reps  - Lying Prone with 2 Pillows  - 1 x daily - 7 x weekly - 3 sets - 5min hold  - Seated Hamstring Stretch  - 1 x daily - 7 x weekly - 3 sets - 30s hold  - Standing Hamstring Stretch with Step  - 1 x daily - 7 x weekly - 3 sets - 30s hold

## 2025-03-05 NOTE — PROGRESS NOTES
PT Evaluation     Today's date: 3/5/2025  Patient name: Milka Johnson  : 1945  MRN: [id number]  Referring provider: Fernando Lobato MD  Dx:   Encounter Diagnosis     ICD-10-CM    1. Left hip pain  M25.552       2. Lumbar radiculopathy  M54.16           Start Time: 0900  Stop Time: 1000  Total time in clinic (min): 60 minutes    Assessment  Impairments: abnormal gait, abnormal or restricted ROM, activity intolerance, impaired balance, impaired physical strength, pain with function, participation limitations and activity limitations    Assessment details: Patient reports to PT with c/c chronic L LE pain. She demonstrates decreased LE strength due to pain. Decreased lumbar ROM and positive special testing implicate radiculopathy from lumbar region. Patient is so forward postured with trunk, she cannot tolerate laying prone without pillows supporting abdomen. Pain limits strength and activity tolerance which inhibits patient from completing dressing and house tasks independently. Patient educated and demonstrates understanding of HEP. Patient is a good candidate for skilled PT to address L LE pain.     Goals  SHORT TERM:  Patient will complete phase I of HEP.   Patient will lay prone without pillows abdomen.  Patient will improve strength by 1/2 grade.     LONG TERM:   Patient will complete phase II of HEP.  Patient will show lumbar extension with moderate restriction.   Patient will improve TUG time to WFL.   Patient will improve 5xSTS time to WFL.  Patient will improve strength to return to functional activities.     Plan  Patient would benefit from: skilled physical therapy    Planned therapy interventions: balance/weight bearing training, functional ROM exercises, home exercise program, therapeutic exercise, therapeutic activities, strengthening, stretching, patient/caregiver education, neuromuscular re-education, manual therapy and joint mobilization    Frequency: 1-2x week  Duration in weeks:  8  Plan of Care beginning date: 3/5/2025  Plan of Care expiration date: 2025  Treatment plan discussed with: patient  Plan details: Patient will complete skilled PT, including HEP, therapeutic exercise, therapeutic activity, neuromuscular reduction, and manual therapy, 1-2x/week for 8 weeks to address L LE pain.       Subjective Evaluation    History of Present Illness  Mechanism of injury: Patient reports to PT with c/c L LE pain. She reports pain started insidiously several years ago. She describes pain as intermittent, sharp and burning to her lateral L hip, quad, and foot. Crossing her leg, standing >20min, walking fast, walking > 3/4 mile, walking uphill, and stairs all aggravate her pain. She is unable to put bend over to put her L sock on or pick things up from the ground. She is otherwise I with ADLs. Sitting with her leg elevated, ice and advil help relieve some pain.  She used to be active with tennis and biking, but now she only goes to the gym, golfs, and hikes. She has a hx of L hip pain from bursitis which she reports felt the same and resolves following injections. This time she was referred for 8 weeks of PT before considering other surgical options.  Patient Goals  Patient goals for therapy: decreased pain, improved balance, increased motion, increased strength, return to sport/leisure activities and independence with ADLs/IADLs  Patient goal: get back to hiking  Pain  Current pain ratin  At best pain ratin  At worst pain rating: 10  Quality: sharp and burning  Relieving factors: ice and medications (sitting, elevation)  Aggravating factors: standing, stair climbing, walking and running    Treatments  Previous treatment: injection treatment        Objective     Active Range of Motion     Lumbar   Flexion:  WFL and with pain  Extension:  with pain Restriction level: maximal  Left lateral flexion:  with pain Restriction level: moderate  Right lateral flexion:  Restriction level:  moderate  Left rotation:  with pain Restriction level: moderate  Right rotation:  with pain Restriction level: moderate    Strength/Myotome Testing     Left Hip   Planes of Motion   Flexion: 3+ (pain)  Abduction: 3+ (pain)    Right Hip   Planes of Motion   Flexion: 4-  Abduction: 4-    Left Knee   Flexion: 4-  Extension: 4-    Right Knee   Flexion: 4-  Extension: 4    Left Ankle/Foot   Dorsiflexion: 4  Plantar flexion: 4    Right Ankle/Foot   Dorsiflexion: 4  Plantar flexion: 4    Tests     Lumbar     Left   Positive crossed SLR, passive SLR and slump test.     Right   Negative crossed SLR, passive SLR and slump test.     Left Pelvic Girdle/Sacrum   Positive: active SLR test.     Right Pelvic Girdle/Sacrum   Negative: active SLR test.     Left Hip   Positive GUSTAVO and FADIR.     Right Hip   Negative GUSTAVO and FADIR.     Ambulation     Observational Gait   Gait: antalgic   Decreased walking speed and stride length.     Functional Assessment        Comments  5xSTS: 17.63s  TU.71s     General Comments:      Lumbar Comments  Prone Lying: Patient unable to tolerate lying prone flat on tx table, needs 2 pillows under abdomen             Precautions: NA      Manuals 3/5                                                                Neuro Re-Ed                                                                                                        Ther Ex             HEP 12min                                                                                                       Ther Activity                                       Gait Training                                       Modalities                                          Access Code: [email or web address]  URL: [email or web address]  Date: 2025  Prepared by: Shannan Grant    Exercises  - Standing Lumbar Extension at Wall - Forearms  - 2-3 x daily - 7 x weekly - 3 sets - 10 reps  - Standing Lumbar Extension with Counter  - 2-3 x daily - 7 x weekly - 3 sets -  10 reps  - Lying Prone with 2 Pillows  - 1 x daily - 7 x weekly - 3 sets - 5min hold  - Seated Hamstring Stretch  - 1 x daily - 7 x weekly - 3 sets - 30s hold  - Standing Hamstring Stretch with Step  - 1 x daily - 7 x weekly - 3 sets - 30s hold

## 2025-03-10 ENCOUNTER — OFFICE VISIT (OUTPATIENT)
Dept: PHYSICAL THERAPY | Age: 80
End: 2025-03-10
Payer: COMMERCIAL

## 2025-03-10 DIAGNOSIS — M25.552 LEFT HIP PAIN: Primary | ICD-10-CM

## 2025-03-10 DIAGNOSIS — M54.16 LUMBAR RADICULOPATHY: ICD-10-CM

## 2025-03-10 PROCEDURE — 97140 MANUAL THERAPY 1/> REGIONS: CPT

## 2025-03-10 PROCEDURE — 97110 THERAPEUTIC EXERCISES: CPT

## 2025-03-10 NOTE — PROGRESS NOTES
Daily Note     Today's date: 3/10/2025  Patient name: Milka Johnson  : 1945  MRN: [id number]  Referring provider: Fernando Lobato MD  Dx:   Encounter Diagnosis     ICD-10-CM    1. Left hip pain  M25.552       2. Lumbar radiculopathy  M54.16           Start Time: 1430  Stop Time: 1515  Total time in clinic (min): 45 minutes    Subjective: Patient reports since last week she's had days with no pain and some days with pain. She drove down to [place] and stayed at a friend's house. She wasn't able to do all of her HEP there. She reports soreness in her L thigh today.       Objective: See treatment diary below      Assessment: Tolerated treatment well. Patient demonstrated fatigue post treatment, exhibited good technique with therapeutic exercises, and would benefit from continued PT      Plan: Continue per plan of care.  Progress treatment as tolerated.       Precautions: NA      Manuals 3/5 3/10           PA Mobs                                                    Neuro Re-Ed                                                                                                        Ther Ex             HEP 12min            Nustep   5min           Slant board   3x30s           HS stretch   2x30s ea           Hip flexor stretch   2x30s L           Standing lumbar extension  3x10           Prone lying + MHP  1 pillow 10min                        Ther Activity                                       Gait Training                                       Modalities

## 2025-03-13 ENCOUNTER — OFFICE VISIT (OUTPATIENT)
Dept: PHYSICAL THERAPY | Age: 80
End: 2025-03-13
Payer: COMMERCIAL

## 2025-03-13 DIAGNOSIS — M25.552 LEFT HIP PAIN: Primary | ICD-10-CM

## 2025-03-13 DIAGNOSIS — M54.16 LUMBAR RADICULOPATHY: ICD-10-CM

## 2025-03-13 PROCEDURE — 97140 MANUAL THERAPY 1/> REGIONS: CPT

## 2025-03-13 PROCEDURE — 97110 THERAPEUTIC EXERCISES: CPT

## 2025-03-13 NOTE — PROGRESS NOTES
Daily Note     Today's date: 3/13/2025  Patient name: Milka Johnson  : 1945  MRN: [id number]  Referring provider: Fernando Lobato MD  Dx:   Encounter Diagnosis     ICD-10-CM    1. Left hip pain  M25.552       2. Lumbar radiculopathy  M54.16           Start Time: 1430  Stop Time: 1515  Total time in clinic (min): 45 minutes    Subjective: Patient reports she does her HEP everyday 2x/day. Yesterday she was feeling so good (no pain), she went to swing golf balls. This was the first time playing golf since last season. She said she felt great walking and playing all day until she went to sleep. She had pain last night that kept her up, and her L thigh has muscle soreness. She also states she hasn't gone to the gym since starting PT.       Objective: See treatment diary below      Assessment: Patient educated muscle soreness is normal is normal after resuming a sport she hasn't played since last season. Patient encouraged to resume her gym regiment as tolerated with focus on good posture and core control during activities. Tolerated treatment well. Patient able to lay prone without any pillows under her abdomen. Tolerated laying prone with head of table raised 1 inch for 5 minutes. Sx reduction by end of session.  Patient demonstrated fatigue post treatment, exhibited good technique with therapeutic exercises, and would benefit from continued PT      Plan: Continue per plan of care.  Progress treatment as tolerated.       Precautions: NA      Manuals 3/5 3/10 3/13          PA Mobs   MS                                                 Neuro Re-Ed                                                                                                        Ther Ex             HEP 12min            Nustep   5min 5min          Slant board   3x30s 3x30s          HS stretch   2x30s ea 2x30s          Hip flexor stretch   2x30s L           Standing lumbar extension  3x10 3x10           Retro walkouts   15# x10           Paloff press   5# x10, x5  ea          Leg press   75# 3x10          Hip ABD   20# 3x10           Prone lying + MHP  1 pillow 10min 10min                       Ther Activity                                       Gait Training                                       Modalities

## 2025-03-17 ENCOUNTER — OFFICE VISIT (OUTPATIENT)
Dept: PHYSICAL THERAPY | Age: 80
End: 2025-03-17
Payer: COMMERCIAL

## 2025-03-17 DIAGNOSIS — M54.16 LUMBAR RADICULOPATHY: ICD-10-CM

## 2025-03-17 DIAGNOSIS — M25.552 LEFT HIP PAIN: Primary | ICD-10-CM

## 2025-03-17 PROCEDURE — 97140 MANUAL THERAPY 1/> REGIONS: CPT

## 2025-03-17 PROCEDURE — 97110 THERAPEUTIC EXERCISES: CPT

## 2025-03-17 NOTE — PROGRESS NOTES
Daily Note     Today's date: 3/17/2025  Patient name: Milka Johnson  : 1945  MRN: [id number]  Referring provider: Fernando Lobato MD  Dx:   Encounter Diagnosis     ICD-10-CM    1. Left hip pain  M25.552       2. Lumbar radiculopathy  M54.16           Start Time: 1215  Stop Time: 1300  Total time in clinic (min): 45 minutes    Subjective: Patient states she played 9 holes of golf of the weekend. She felt pretty good, but did have to sit out a couple holes to give her back a break. Today she says she is proud of herself for getting through her whole HEP this morning, but now she has L buttock pain.       Objective: See treatment diary below      Assessment: Tolerated treatment well. Continues to improve activity tolerance and lumbar mobility. Sx reduction by end of session. Patient demonstrated fatigue post treatment, exhibited good technique with therapeutic exercises, and would benefit from continued PT      Plan: Continue per plan of care.  Progress treatment as tolerated.       Precautions: NA      Manuals 3/5 3/10 3/13 3/17         PA Mobs   MS MS                                                Neuro Re-Ed                                                                                                        Ther Ex             HEP 12min            Nustep   5min 5min 5min         Slant board   3x30s 3x30s          HS stretch   2x30s ea 2x30s          Hip flexor stretch   2x30s L           Standing lumbar extension  3x10 3x10  3x10          Retro walkouts   15# x10 15# x10          Paloff press   5# x10, x5  ea 5# 2x10 ea          Leg press   75# 3x10 75# 3x10          Hip ABD   20# 3x10  20# 3x10          Cat cow     3x10         Prone lying + MHP  1 pillow 10min 10min 10min                      Ther Activity                                       Gait Training                                       Modalities

## 2025-03-20 ENCOUNTER — OFFICE VISIT (OUTPATIENT)
Dept: PHYSICAL THERAPY | Age: 80
End: 2025-03-20
Payer: COMMERCIAL

## 2025-03-20 DIAGNOSIS — M54.16 LUMBAR RADICULOPATHY: ICD-10-CM

## 2025-03-20 DIAGNOSIS — M25.552 LEFT HIP PAIN: Primary | ICD-10-CM

## 2025-03-20 PROCEDURE — 97110 THERAPEUTIC EXERCISES: CPT

## 2025-03-20 PROCEDURE — 97140 MANUAL THERAPY 1/> REGIONS: CPT

## 2025-03-20 NOTE — PROGRESS NOTES
Daily Note     Today's date: 3/20/2025  Patient name: Milka Johnson  : 1945  MRN: [id number]  Referring provider: Fernando Lobato MD  Dx:   Encounter Diagnosis     ICD-10-CM    1. Left hip pain  M25.552       2. Lumbar radiculopathy  M54.16           Start Time: 1400  Stop Time: 1445  Total time in clinic (min): 45 minutes    Subjective: Patient states she played 9 holes of golf yesterday. When she started feeling some pain, she would do several lumbar extensions and says she was able to play all 9 holes without pain. She a little sore today, but says she feels good.       Objective: See treatment diary below      Assessment: Tolerated treatment well. Continues to improve activity tolerance and lumbar mobility. Patient demonstrated fatigue post treatment, exhibited good technique with therapeutic exercises, and would benefit from continued PT      Plan: Continue per plan of care.  Progress treatment as tolerated.       Precautions: NA      Manuals 3/5 3/10 3/13 3/17 3/20         PA Mobs   MS MS MS                                               Neuro Re-Ed                                                                                                        Ther Ex             HEP 12min            Nustep   5min 5min 5min 5min        Slant board   3x30s 3x30s          HS stretch   2x30s ea 2x30s          Hip flexor stretch   2x30s L           Standing lumbar extension  3x10 3x10  3x10  3x10        Retro walkouts   15# x10 15# x10  15# x10         Paloff press   5# x10, x5  ea 5# 2x10 ea  5# 2x10 ea        Leg press   75# 3x10 75# 3x10  75# 3x10         Hip ABD   20# 3x10  20# 3x10  20# 3x10         Cat cow     3x10 2x10         Prone lying + MHP  1 pillow 10min 10min 10min 10min                     Ther Activity                                       Gait Training                                       Modalities

## 2025-03-24 ENCOUNTER — OFFICE VISIT (OUTPATIENT)
Dept: PHYSICAL THERAPY | Age: 80
End: 2025-03-24
Payer: COMMERCIAL

## 2025-03-24 DIAGNOSIS — M54.16 LUMBAR RADICULOPATHY: Primary | ICD-10-CM

## 2025-03-24 DIAGNOSIS — M25.552 LEFT HIP PAIN: ICD-10-CM

## 2025-03-24 PROCEDURE — 97140 MANUAL THERAPY 1/> REGIONS: CPT

## 2025-03-24 PROCEDURE — 97110 THERAPEUTIC EXERCISES: CPT

## 2025-03-24 NOTE — PROGRESS NOTES
Daily Note     Today's date: 3/24/2025  Patient name: Milka Johnson  : 1945  MRN: [id number]  Referring provider: Fernando Lobato MD  Dx:   Encounter Diagnosis     ICD-10-CM    1. Lumbar radiculopathy  M54.16       2. Left hip pain  M25.552           Start Time: 0140  Stop Time: 1445  Total time in clinic (min): 785 minutes    Subjective: Patient reports she had pain in her back and legs while she was at the gym yesterday. She had nerve pain in her L leg this morning, that resolved with prone lying. She feels like she is improving some.       Objective: See treatment diary below      Assessment: Tolerated treatment well. Patient is still very sensitive to grade 1 mobs L-spine. Sx reduction by end of session . Educated patient on how she can instruct her partner at home to apply deep pressure to her lumbar spine to help with desensitization. Patient demonstrated fatigue post treatment, exhibited good technique with therapeutic exercises, and would benefit from continued PT      Plan: Continue per plan of care.  Progress treatment as tolerated.       Precautions: NA      Manuals 3/5 3/10 3/13 3/17 3/20  3/24       PA Mobs   MS MS MS MS                                              Neuro Re-Ed                                                                                                        Ther Ex             HEP 12min            Nustep   5min 5min 5min 5min 5min       Slant board   3x30s 3x30s   3x30s       HS stretch   2x30s ea 2x30s          Hip flexor stretch   2x30s L           Standing lumbar extension  3x10 3x10  3x10  3x10 3x10        Retro walkouts   15# x10 15# x10  15# x10  15# x10        Paloff press   5# x10, x5  ea 5# 2x10 ea  5# 2x10 ea 5# 2x10ea       Leg press   75# 3x10 75# 3x10  75# 3x10  75# 3x10       Hip ABD   20# 3x10  20# 3x10  20# 3x10         Cat cow     3x10 2x10  x25       Prone lying + MHP  1 pillow 10min 10min 10min 10min 10min                    Ther Activity                                        Gait Training                                       Modalities

## 2025-03-27 ENCOUNTER — OFFICE VISIT (OUTPATIENT)
Dept: PHYSICAL THERAPY | Age: 80
End: 2025-03-27
Payer: COMMERCIAL

## 2025-03-27 DIAGNOSIS — M54.16 LUMBAR RADICULOPATHY: Primary | ICD-10-CM

## 2025-03-27 DIAGNOSIS — M25.552 LEFT HIP PAIN: ICD-10-CM

## 2025-03-27 PROCEDURE — 97110 THERAPEUTIC EXERCISES: CPT

## 2025-03-27 NOTE — PROGRESS NOTES
Daily Note     Today's date: 3/27/2025  Patient name: Milka Johnson  : 1945  MRN: [id number]  Referring provider: Fernando Lobato MD  Dx:   Encounter Diagnosis     ICD-10-CM    1. Lumbar radiculopathy  M54.16       2. Left hip pain  M25.552           Start Time: 1400  Stop Time: 1445  Total time in clinic (min): 45 minutes    Subjective: Patient felt good after last session. Yesterday she walked a mile and workout at the gym and completed house chores without any pain. Last night she had intense L LE pain that kept her up at night. Today she feels the pain is less intense. She feels soreness down to her L LE to mid-shin. She is feeling discouraged - like her pain is limiting her from participation full in social outings.       Objective: See treatment diary below      Assessment: Tolerated treatment fair. Patient is still sensitive to light pressure on her low back with PA-mobs. Discussed asking referring physician about potential for a steroid injection to reduce pain so she can tolerate PT better. Patient demonstrated fatigue post treatment, exhibited good technique with therapeutic exercises, and would benefit from continued PT      Plan: Continue per plan of care.  Progress treatment as tolerated.       Precautions: NA      Manuals 3/5 3/10 3/13 3/17 3/20  3/24 3/27       PA Mobs   MS MS MS MS MS                                             Neuro Re-Ed                                                                                                        Ther Ex             HEP 12min            Nustep   5min 5min 5min 5min 5min 5min      Slant board   3x30s 3x30s   3x30s       HS stretch   2x30s ea 2x30s          Hip flexor stretch   2x30s L           Standing lumbar extension  3x10 3x10  3x10  3x10 3x10        Retro walkouts   15# x10 15# x10  15# x10  15# x10  15# x10       Paloff press   5# x10, x5  ea 5# 2x10 ea  5# 2x10 ea 5# 2x10ea 5# x10ea       Leg press   75# 3x10 75# 3x10  75#  3x10  75# 3x10 75# 3x10       Hip ABD   20# 3x10  20# 3x10  20# 3x10         Cat cow     3x10 2x10  x25 X20       Prone lying + MHP  1 pillow 10min 10min 10min 10min 10min 2x5min                   Ther Activity                                       Gait Training                                       Modalities

## 2025-03-31 ENCOUNTER — OFFICE VISIT (OUTPATIENT)
Dept: PHYSICAL THERAPY | Age: 80
End: 2025-03-31
Payer: COMMERCIAL

## 2025-03-31 DIAGNOSIS — M25.552 LEFT HIP PAIN: ICD-10-CM

## 2025-03-31 DIAGNOSIS — M54.16 LUMBAR RADICULOPATHY: Primary | ICD-10-CM

## 2025-03-31 PROCEDURE — 97110 THERAPEUTIC EXERCISES: CPT

## 2025-03-31 NOTE — PROGRESS NOTES
Daily Note     Today's date: 3/31/2025  Patient name: Milka Johnson  : 1945  MRN: [id number]  Referring provider: Fernando Lobato MD  Dx:   Encounter Diagnosis     ICD-10-CM    1. Lumbar radiculopathy  M54.16       2. Left hip pain  M25.552           Start Time: 1400  Stop Time: 1445  Total time in clinic (min): 45 minutes    Subjective: Patient reports she feels good today - minimal pain.       Objective: See treatment diary below      Assessment: Tolerated treatment well. Patient progresses lumbar extension mobility to prone on elbows. Educated her she may be sore later as she reaches new end range. Patient demonstrated fatigue post treatment, exhibited good technique with therapeutic exercises, and would benefit from continued PT      Plan: Continue per plan of care.  Progress treatment as tolerated.       Precautions: NA      Manuals 3/5 3/10 3/13 3/17 3/20  3/24 3/27  3/31     PA Mobs   MS MS MS MS MS MS                                            Neuro Re-Ed                                                                                                        Ther Ex             HEP 12min            Nustep   5min 5min 5min 5min 5min 5min      TM         5min     Slant board   3x30s 3x30s   3x30s       HS stretch   2x30s ea 2x30s          Hip flexor stretch   2x30s L           Standing lumbar extension  3x10 3x10  3x10  3x10 3x10   3x10      Retro walkouts   15# x10 15# x10  15# x10  15# x10  15# x10       Paloff press   5# x10, x5  ea 5# 2x10 ea  5# 2x10 ea 5# 2x10ea 5# x10ea  7# x10ea     Leg press   75# 3x10 75# 3x10  75# 3x10  75# 3x10 75# 3x10  80# 3x10      Hip ABD   20# 3x10  20# 3x10  20# 3x10    20# 3x10     Cat cow     3x10 2x10  x25 X20       Prone lying + MHP  1 pillow 10min 10min 10min 10min 10min 2x5min 2x5min     Prone on elbows        X10                   Ther Activity                                       Gait Training                                       Modalities

## 2025-04-03 ENCOUNTER — OFFICE VISIT (OUTPATIENT)
Dept: PHYSICAL THERAPY | Age: 80
End: 2025-04-03
Payer: COMMERCIAL

## 2025-04-03 DIAGNOSIS — M25.552 LEFT HIP PAIN: ICD-10-CM

## 2025-04-03 DIAGNOSIS — M54.16 LUMBAR RADICULOPATHY: Primary | ICD-10-CM

## 2025-04-03 PROCEDURE — 97140 MANUAL THERAPY 1/> REGIONS: CPT

## 2025-04-03 PROCEDURE — 97110 THERAPEUTIC EXERCISES: CPT

## 2025-04-03 NOTE — PROGRESS NOTES
Daily Note     Today's date: 4/3/2025  Patient name: Milka Johnson  : 1945  MRN: [id number]  Referring provider: Fernando Lobato MD  Dx:   Encounter Diagnosis     ICD-10-CM    1. Lumbar radiculopathy  M54.16       2. Left hip pain  M25.552           Start Time: 1400  Stop Time: 1445  Total time in clinic (min): 45 minutes    Subjective: Patient reports she did her HEP this morning. She was able to do 5 pressups on her elbows.       Objective: See treatment diary below      Assessment: Tolerated treatment well. Patient still demonstrates maximal mobility restriction with lumbar extension, but is becoming more tolerant of prolonged prone lying and press ups on elbows. Patient demonstrated fatigue post treatment, exhibited good technique with therapeutic exercises, and would benefit from continued PT      Plan: Continue per plan of care.  Progress treatment as tolerated.       Precautions: NA      Manuals 3/5 3/10 3/13 3/17 3/20  3/24 3/27  3/31 4/3    PA Mobs   MS MS MS MS MS MS MS                                           Neuro Re-Ed                                                                                                        Ther Ex             HEP 12min            Nustep   5min 5min 5min 5min 5min 5min      TM         5min 5min    Slant board   3x30s 3x30s   3x30s   3x30s    HS stretch   2x30s ea 2x30s          Hip flexor stretch   2x30s L           Standing lumbar extension  3x10 3x10  3x10  3x10 3x10   3x10  3x10     Retro walkouts   15# x10 15# x10  15# x10  15# x10  15# x10   15# x10    Paloff press   5# x10, x5  ea 5# 2x10 ea  5# 2x10 ea 5# 2x10ea 5# x10ea  7# x10ea 7# x10 ea    Leg press   75# 3x10 75# 3x10  75# 3x10  75# 3x10 75# 3x10  80# 3x10  80# 3x10     Hip ABD   20# 3x10  20# 3x10  20# 3x10    20# 3x10 20# 3x10     Cat cow     3x10 2x10  x25 X20       Prone lying + MHP  1 pillow 10min 10min 10min 10min 10min 2x5min 2x5min 2x5min    Prone on elbows        X10  x10                  Ther Activity                                       Gait Training                                       Modalities

## 2025-04-07 ENCOUNTER — OFFICE VISIT (OUTPATIENT)
Dept: PHYSICAL THERAPY | Age: 80
End: 2025-04-07
Payer: COMMERCIAL

## 2025-04-07 DIAGNOSIS — M25.552 LEFT HIP PAIN: ICD-10-CM

## 2025-04-07 DIAGNOSIS — M54.16 LUMBAR RADICULOPATHY: Primary | ICD-10-CM

## 2025-04-07 PROCEDURE — 97140 MANUAL THERAPY 1/> REGIONS: CPT

## 2025-04-07 PROCEDURE — 97110 THERAPEUTIC EXERCISES: CPT

## 2025-04-07 NOTE — PROGRESS NOTES
Daily Note     Today's date: 2025  Patient name: Milka Johnson  : 1945  MRN: [id number]  Referring provider: Fernando Lobato MD  Dx:   Encounter Diagnosis     ICD-10-CM    1. Lumbar radiculopathy  M54.16       2. Left hip pain  M25.552           Start Time: 1600  Stop Time: 1645  Total time in clinic (min): 45 minutes    Subjective: Patient reports the intensity and frequency of her pain is significantly decreased since starting PT, but she still has pain and can't reach down to put her socks and shoes on by herself yet. She was able to play a full game of golf last week with frequent stretch breaks.       Objective: See treatment diary below      Assessment: Tolerated treatment well. Activity tolerance continues to improve. Patient is more tolerable to grade 1 P-A mobs to L-spine. Patient demonstrated fatigue post treatment, exhibited good technique with therapeutic exercises, and would benefit from continued PT      Plan: Continue per plan of care.  Progress treatment as tolerated.       Precautions: NA      Manuals        3/31 4/3 4/7   PA Mobs        MS MS MS                                          Neuro Re-Ed                                                                                                        Ther Ex             HEP             Nustep              TM         5min 5min 5min   Slant board          3x30s 3x30s   HS stretch              Hip flexor stretch              Standing lumbar extension        3x10  3x10  3x10    Retro walkouts         15# x10 15# x10    Paloff press        7# x10ea 7# x10 ea 7# x10 ea   Leg press        80# 3x10  80# 3x10  80# 3x10    Hip ABD        20# 3x10 20# 3x10  20# 3x10    Cat cow              Prone lying + MHP        2x5min 2x5min 2x5min   Prone on elbows        X10  x10 x10                Ther Activity                                       Gait Training                                       Modalities

## 2025-04-14 ENCOUNTER — OFFICE VISIT (OUTPATIENT)
Dept: PHYSICAL THERAPY | Age: 80
End: 2025-04-14
Payer: COMMERCIAL

## 2025-04-14 DIAGNOSIS — M25.552 LEFT HIP PAIN: ICD-10-CM

## 2025-04-14 DIAGNOSIS — M54.16 LUMBAR RADICULOPATHY: Primary | ICD-10-CM

## 2025-04-14 PROCEDURE — 97140 MANUAL THERAPY 1/> REGIONS: CPT

## 2025-04-14 PROCEDURE — 97110 THERAPEUTIC EXERCISES: CPT

## 2025-04-14 NOTE — PROGRESS NOTES
Daily Note     Today's date: 2025  Patient name: Milka Johnson  : 1945  MRN: [id number]  Referring provider: Fernando Lobato MD  Dx:   Encounter Diagnosis     ICD-10-CM    1. Lumbar radiculopathy  M54.16       2. Left hip pain  M25.552           Start Time: 1545  Stop Time: 1630  Total time in clinic (min): 45 minutes    Subjective: Patient reports she worked out at home and the gym yesterday. Afterwards she was feeling really sore, but she was able to get the pain to go away with elevation, advil and whirlpool. Today she reports no pain in her low back or L hip. She reports she is still unable to bend over to put her socks and shoes on.       Objective: See treatment diary below      Assessment: Tolerated treatment well. Continues to improve tolerance for prone lying and P-A lumbar mobilizations.  Patient demonstrated fatigue post treatment, exhibited good technique with therapeutic exercises, and would benefit from continued PT      Plan: Continue per plan of care.  Progress treatment as tolerated.       Precautions: NA      Manuals 4/14       3/31 4/3 4/7   PA Mobs        MS MS MS                                          Neuro Re-Ed                                                                                                        Ther Ex             HEP             Nustep              Bike 5min       5min 5min 5min   Slant board          3x30s 3x30s   HS stretch              Hip flexor stretch              Standing lumbar extension 3x10       3x10  3x10  3x10    Retro walkouts 15# x10        15# x10 15# x10    Paloff press 7# x10ea       7# x10ea 7# x10 ea 7# x10 ea   Leg press 80# 3x10       80# 3x10  80# 3x10  80# 3x10    Hip ABD 20# 3x10       20# 3x10 20# 3x10  20# 3x10    Cat cow  2x10            Prone lying + MHP 2x8min       2x5min 2x5min 2x5min   Prone on elbows x10       X10  x10 x10                Ther Activity                                       Gait Training                                        Modalities

## 2025-04-17 ENCOUNTER — OFFICE VISIT (OUTPATIENT)
Dept: PHYSICAL THERAPY | Age: 80
End: 2025-04-17
Payer: COMMERCIAL

## 2025-04-17 DIAGNOSIS — M54.16 LUMBAR RADICULOPATHY: Primary | ICD-10-CM

## 2025-04-17 DIAGNOSIS — M25.552 LEFT HIP PAIN: ICD-10-CM

## 2025-04-17 PROCEDURE — 97110 THERAPEUTIC EXERCISES: CPT

## 2025-04-17 PROCEDURE — 97140 MANUAL THERAPY 1/> REGIONS: CPT

## 2025-04-17 NOTE — PROGRESS NOTES
Daily Note     Today's date: 2025  Patient name: Milka Johnson  : 1945  MRN: [id number]  Referring provider: Fernando Lobato MD  Dx:   Encounter Diagnosis     ICD-10-CM    1. Lumbar radiculopathy  M54.16       2. Left hip pain  M25.552           Start Time: 1545  Stop Time: 1630  Total time in clinic (min): 45 minutes    Subjective: Patient reports on Tuesday she played 9 holes of golf without any pain. She did not have any pain while she was playing, after playing or sleeping Tuesday night. She has had pain down her L LE since Wednesday morning. Pain is less intense at start of today's session than it was Wednesday morning - since she says she's been doing her HEP several times.       Objective: See treatment diary below      Assessment: Tolerated treatment well. Patient tolerates laying prone today with one pillow under her chest. Patient demonstrated fatigue post treatment, exhibited good technique with therapeutic exercises, and would benefit from continued PT      Plan: Continue per plan of care.  Progress treatment as tolerated.       Precautions: NA      Manuals 4/14 4/17       3/31 4/3 4/7   PA Mobs MS MS      MS MS MS                                          Neuro Re-Ed                                                                                                        Ther Ex             HEP             Nustep              Bike 5min 5min      5min 5min 5min   Slant board          3x30s 3x30s   HS stretch              Hip flexor stretch              Standing lumbar extension 3x10 3x10       3x10  3x10  3x10    Retro walkouts 15# x10 15# x10       15# x10 15# x10    Paloff press 7# x10ea 7# x10ea      7# x10ea 7# x10 ea 7# x10 ea   Leg press 80# 3x10 85# 3x10       80# 3x10  80# 3x10  80# 3x10    Hip ABD 20# 3x10 20# 3x10       20# 3x10 20# 3x10  20# 3x10    Cat cow  2x10 2x10            Prone lying + MHP 2x8min 2x8 min       2x5min 2x5min 2x5min   Prone on elbows x10 X10       X10  x10  x10                Ther Activity                                       Gait Training                                       Modalities

## 2025-04-21 ENCOUNTER — APPOINTMENT (OUTPATIENT)
Dept: PHYSICAL THERAPY | Age: 80
End: 2025-04-21
Payer: COMMERCIAL

## 2025-04-24 ENCOUNTER — APPOINTMENT (OUTPATIENT)
Dept: PHYSICAL THERAPY | Age: 80
End: 2025-04-24
Payer: COMMERCIAL

## 2025-04-28 ENCOUNTER — OFFICE VISIT (OUTPATIENT)
Dept: PHYSICAL THERAPY | Age: 80
End: 2025-04-28
Payer: COMMERCIAL

## 2025-04-28 DIAGNOSIS — M25.552 LEFT HIP PAIN: ICD-10-CM

## 2025-04-28 DIAGNOSIS — M54.16 LUMBAR RADICULOPATHY: Primary | ICD-10-CM

## 2025-04-28 PROCEDURE — 97110 THERAPEUTIC EXERCISES: CPT

## 2025-04-28 PROCEDURE — 97140 MANUAL THERAPY 1/> REGIONS: CPT

## 2025-04-28 NOTE — PROGRESS NOTES
Daily Note     Today's date: 2025  Patient name: Milka Johnson  : 1945  MRN: [id number]  Referring provider: Fernando Lobato MD  Dx:   Encounter Diagnosis     ICD-10-CM    1. Lumbar radiculopathy  M54.16       2. Left hip pain  M25.552           Start Time: 1445  Stop Time: 1530  Total time in clinic (min): 45 minutes    Subjective: Patient reports she has some soreness in her L thigh from her 6.5 hour drive yesterday, but she went through all last week without any pain. She says she is did her HEP everyday during her trip. Patient reports she had no pain the day after playing golf last week.        Objective: See treatment diary below      Assessment: Tolerated treatment well. Patient is tender to P-A mobs today. Patient tolerates prone press ups on elbows.  Patient demonstrated fatigue post treatment, exhibited good technique with therapeutic exercises, and would benefit from continued PT      Plan: Continue per plan of care.  Progress treatment as tolerated.       Precautions: NA      Manuals            PA Mobs MS MS MS                                                 Neuro Re-Ed                                                                                                        Ther Ex             HEP             Nustep              Bike 5min 5min 5min          Slant board              HS stretch              Hip flexor stretch              Standing lumbar extension 3x10 3x10  3x10           Retro walkouts 15# x10 15# x10 15# x10           Paloff press 7# x10ea 7# x10ea 8# x10ea          Leg press 80# 3x10 85# 3x10  85# 3x10           Hip ABD 20# 3x10 20# 3x10  20# 3x10           Cat cow  2x10 2x10  2x10           Prone lying + MHP 2x8min 2x8 min  2x8min          Prone on elbows x10 X10  X10                        Ther Activity                                       Gait Training                                       Modalities

## 2025-05-01 ENCOUNTER — OFFICE VISIT (OUTPATIENT)
Dept: PHYSICAL THERAPY | Age: 80
End: 2025-05-01
Payer: COMMERCIAL

## 2025-05-01 DIAGNOSIS — M25.552 LEFT HIP PAIN: ICD-10-CM

## 2025-05-01 DIAGNOSIS — M54.16 LUMBAR RADICULOPATHY: Primary | ICD-10-CM

## 2025-05-01 PROCEDURE — 97110 THERAPEUTIC EXERCISES: CPT

## 2025-05-01 PROCEDURE — 97140 MANUAL THERAPY 1/> REGIONS: CPT

## 2025-05-01 NOTE — PROGRESS NOTES
Daily Note     Today's date: 2025  Patient name: Milka Johnson  : 1945  MRN: [id number]  Referring provider: Fernando Lobato MD  Dx:   Encounter Diagnosis     ICD-10-CM    1. Lumbar radiculopathy  M54.16       2. Left hip pain  M25.552           Start Time: 1215  Stop Time: 1300  Total time in clinic (min): 45 minutes    Subjective: Patient reports no pain today. She feels she is making good progress. Patient reports she has been able top on her sock and shoes recently. She says the L foot is easy, but the R foot is still a little difficult.       Objective: See treatment diary below      Assessment: Tolerated treatment well. Educated patient to incorporate deep breathing during prone press ups to improve relaxation in lumbar extension. Tolerates core strength progressions.  Patient demonstrated fatigue post treatment, exhibited good technique with therapeutic exercises, and would benefit from continued PT      Plan: Continue per plan of care.  Progress treatment as tolerated.       Precautions: NA      Manuals          PA Mobs MS MS MS MS                                                Neuro Re-Ed                                                                                                        Ther Ex             HEP             Nustep              Bike 5min 5min 5min 5min         Slant board              HS stretch              Hip flexor stretch              Standing lumbar extension 3x10 3x10  3x10  2x10          Retro walkouts 15# x10 15# x10 15# x10  18# x10          Paloff press 7# x10ea 7# x10ea 8# x10ea 8# x10 ea         Leg press 80# 3x10 85# 3x10  85# 3x10            Hip ABD 20# 3x10 20# 3x10  20# 3x10  25# 3x10         Cat cow  2x10 2x10  2x10           Supine march w/ counter wt     5# 2x10         Prone lying + MHP 2x8min 2x8 min  2x8min 2x8min         Prone on elbows x10 X10  X10  X10                       Ther Activity                                        Gait Training                                       Modalities

## 2025-05-05 ENCOUNTER — OFFICE VISIT (OUTPATIENT)
Dept: PHYSICAL THERAPY | Age: 80
End: 2025-05-05
Attending: INTERNAL MEDICINE
Payer: COMMERCIAL

## 2025-05-05 DIAGNOSIS — M25.552 LEFT HIP PAIN: ICD-10-CM

## 2025-05-05 DIAGNOSIS — M54.16 LUMBAR RADICULOPATHY: Primary | ICD-10-CM

## 2025-05-05 PROCEDURE — 97110 THERAPEUTIC EXERCISES: CPT

## 2025-05-05 PROCEDURE — 97140 MANUAL THERAPY 1/> REGIONS: CPT

## 2025-05-05 NOTE — PROGRESS NOTES
Daily Note     Today's date: 2025  Patient name: Milka Johnson  : 1945  MRN: [id number]  Referring provider: Fernando Lobato MD  Dx:   Encounter Diagnosis     ICD-10-CM    1. Lumbar radiculopathy  M54.16       2. Left hip pain  M25.552           Start Time: 1445  Stop Time: 1530  Total time in clinic (min): 45 minutes    Subjective: Patient reports some LBP today. She's not sure if it's the weather or the fact that she drove to/from [place] yesterday. Patient is able to put on socks and shoes independently now, with some level of difficulty.       Objective: See treatment diary below      Assessment: Tolerated treatment well. Sx reduction by end of session. Patient tolerates grade IV P-A lumbar mobilizations today. Demonstrates moderate lumbar extension mobility. Patient demonstrated fatigue post treatment, exhibited good technique with therapeutic exercises, and would benefit from continued PT      Plan: Continue per plan of care.  Progress treatment as tolerated.       Precautions: NA      Manuals         PA Mobs MS MS MS MS MS                                               Neuro Re-Ed                                                                                                        Ther Ex             HEP             Nustep              Bike 5min 5min 5min 5min 5min        Slant board              HS stretch              Hip flexor stretch              Standing lumbar extension 3x10 3x10  3x10  2x10  2x10        Retro walkouts 15# x10 15# x10 15# x10  18# x10  15# x10        Paloff press 7# x10ea 7# x10ea 8# x10ea 8# x10 ea 8# 2x10ea         Leg press 80# 3x10 85# 3x10  85# 3x10    90# 3x10         Hip ABD 20# 3x10 20# 3x10  20# 3x10  25# 3x10 25# 3x10         Cat cow  2x10 2x10  2x10           Supine march w/ counter wt     5# 2x10 5# 2x10        Prone lying + MHP 2x8min 2x8 min  2x8min 2x8min 2x8min        Prone on elbows x10 X10  X10  X10  x10                     Ther  Activity                                       Gait Training                                       Modalities

## 2025-05-08 ENCOUNTER — OFFICE VISIT (OUTPATIENT)
Dept: PHYSICAL THERAPY | Age: 80
End: 2025-05-08
Attending: INTERNAL MEDICINE
Payer: COMMERCIAL

## 2025-05-08 DIAGNOSIS — M25.552 LEFT HIP PAIN: ICD-10-CM

## 2025-05-08 DIAGNOSIS — M54.16 LUMBAR RADICULOPATHY: Primary | ICD-10-CM

## 2025-05-08 PROCEDURE — 97110 THERAPEUTIC EXERCISES: CPT

## 2025-05-08 PROCEDURE — 97140 MANUAL THERAPY 1/> REGIONS: CPT

## 2025-05-08 NOTE — PROGRESS NOTES
Daily Note     Today's date: 2025  Patient name: Milka Johnson  : 1945  MRN: [id number]  Referring provider: Fernando Lobato MD  Dx:   Encounter Diagnosis     ICD-10-CM    1. Lumbar radiculopathy  M54.16       2. Left hip pain  M25.552           Start Time: 1115  Stop Time: 1200  Total time in clinic (min): 45 minutes    Subjective: Patient reports she feels good today. Reports some pain in her L thigh at start of today's session.       Objective: See treatment diary below      Assessment: Tolerated treatment well. Continues to improve strength and lumbar mobility - still moderately limited. Sx reduction by end of session.  Patient demonstrated fatigue post treatment, exhibited good technique with therapeutic exercises, and would benefit from continued PT      Plan: Continue per plan of care.  Progress treatment as tolerated.       Precautions: NA      Manuals        PA Mobs MS MS MS MS MS MS                                              Neuro Re-Ed                                                                                                        Ther Ex             HEP             Nustep              Bike 5min 5min 5min 5min 5min 5min       Slant board              HS stretch              Hip flexor stretch              Standing lumbar extension 3x10 3x10  3x10  2x10  2x10 2x10       Retro walkouts 15# x10 15# x10 15# x10  18# x10  15# x10 15# x10        Paloff press 7# x10ea 7# x10ea 8# x10ea 8# x10 ea 8# 2x10ea  8# 2x10 ea       Leg press 80# 3x10 85# 3x10  85# 3x10    90# 3x10  100# 3x1        Hip ABD 20# 3x10 20# 3x10  20# 3x10  25# 3x10 25# 3x10  25# 3x10        Cat cow  2x10 2x10  2x10           Supine march w/ counter wt     5# 2x10 5# 2x10 7.5# 2x10        Prone lying + MHP 2x8min 2x8 min  2x8min 2x8min 2x8min x10min       Prone on elbows x10 X10  X10  X10  x10 x10                    Ther Activity                                       Gait Training                                        Modalities

## 2025-05-12 ENCOUNTER — OFFICE VISIT (OUTPATIENT)
Dept: PHYSICAL THERAPY | Age: 80
End: 2025-05-12
Attending: INTERNAL MEDICINE
Payer: COMMERCIAL

## 2025-05-12 DIAGNOSIS — M25.552 LEFT HIP PAIN: ICD-10-CM

## 2025-05-12 DIAGNOSIS — M54.16 LUMBAR RADICULOPATHY: Primary | ICD-10-CM

## 2025-05-12 PROCEDURE — 97140 MANUAL THERAPY 1/> REGIONS: CPT

## 2025-05-12 PROCEDURE — 97110 THERAPEUTIC EXERCISES: CPT

## 2025-05-12 NOTE — PROGRESS NOTES
Daily Note     Today's date: 2025  Patient name: Milka Johnson  : 1945  MRN: [id number]  Referring provider: Fernando Lobato MD  Dx:   Encounter Diagnosis     ICD-10-CM    1. Lumbar radiculopathy  M54.16       2. Left hip pain  M25.552           Start Time: 1400  Stop Time: 1445  Total time in clinic (min): 45 minutes    Subjective: Patient reports she hit 50 golf balls this morning with little pain. She still has difficulty putting on her socks and shoes. She reports intense pain in L thigh when she bends over. She feels rotating is significantly improved.       Objective: See treatment diary below      Assessment: Tolerated treatment well. Patient tolerates strength progressions. Demonstrates improved extension mobility with press up from elbows. Tolerates grade IV mobilizations. Sx reduction by end of session. Patient demonstrated fatigue post treatment, exhibited good technique with therapeutic exercises, and would benefit from continued PT      Plan: Continue per plan of care.  Progress treatment as tolerated.       Precautions: NA      Manuals       PA Mobs MS MS MS MS MS MS MS                                             Neuro Re-Ed                                                                                                        Ther Ex             HEP             Nustep              Bike 5min 5min 5min 5min 5min 5min 5min      Slant board              HS stretch              Hip flexor stretch              Standing lumbar extension 3x10 3x10  3x10  2x10  2x10 2x10 2x10      Retro walkouts 15# x10 15# x10 15# x10  18# x10  15# x10 15# x10  18# 2x10      Paloff press 7# x10ea 7# x10ea 8# x10ea 8# x10 ea 8# 2x10ea  8# 2x10 ea 10# x10 ea      Leg press 80# 3x10 85# 3x10  85# 3x10    90# 3x10  100# 3x1  100# 3x10       Hip ABD 20# 3x10 20# 3x10  20# 3x10  25# 3x10 25# 3x10  25# 3x10  25# 3x10       Cat cow  2x10 2x10  2x10           Supine march w/ counter  wt     5# 2x10 5# 2x10 7.5# 2x10        Prone lying + MHP 2x8min 2x8 min  2x8min 2x8min 2x8min x10min x10min      Prone on elbows x10 X10  X10  X10  x10 x10 x12                   Ther Activity                                       Gait Training                                       Modalities

## 2025-05-15 ENCOUNTER — OFFICE VISIT (OUTPATIENT)
Dept: PHYSICAL THERAPY | Age: 80
End: 2025-05-15
Attending: INTERNAL MEDICINE
Payer: COMMERCIAL

## 2025-05-15 DIAGNOSIS — M25.552 LEFT HIP PAIN: ICD-10-CM

## 2025-05-15 DIAGNOSIS — M54.16 LUMBAR RADICULOPATHY: Primary | ICD-10-CM

## 2025-05-15 PROCEDURE — 97140 MANUAL THERAPY 1/> REGIONS: CPT

## 2025-05-15 PROCEDURE — 97110 THERAPEUTIC EXERCISES: CPT

## 2025-05-15 NOTE — PROGRESS NOTES
Daily Note     Today's date: 5/15/2025  Patient name: Milka Johnson  : 1945  MRN: [id number]  Referring provider: Fernando Lobato MD  Dx:   Encounter Diagnosis     ICD-10-CM    1. Lumbar radiculopathy  M54.16       2. Left hip pain  M25.552           Start Time: 1400  Stop Time: 1445  Total time in clinic (min): 45 minutes    Subjective: Patient reports zero pain over the last two days. She reports getting her shoes on is getting easier.       Objective: See treatment diary below      Assessment: Tolerated treatment well. Patient demonstrates good lumbar extension mobility with press ups on elbows. Patient tolerates laying prone with 2 pillows under her chest.  Patient demonstrated fatigue post treatment, exhibited good technique with therapeutic exercises, and would benefit from continued PT      Plan: Continue per plan of care.  Progress treatment as tolerated.       Precautions: NA      Manuals 4/14 4/17  4/28  5/1 5/5 5/7 5/12 5/15     PA Mobs MS MS MS MS MS MS MS MS                                            Neuro Re-Ed                                                                                                        Ther Ex             HEP             Nustep              Bike 5min 5min 5min 5min 5min 5min 5min 5min     Slant board              HS stretch              Hip flexor stretch              Standing lumbar extension 3x10 3x10  3x10  2x10  2x10 2x10 2x10 2x10     Retro walkouts 15# x10 15# x10 15# x10  18# x10  15# x10 15# x10  18# 2x10 16# 2x10      Paloff press 7# x10ea 7# x10ea 8# x10ea 8# x10 ea 8# 2x10ea  8# 2x10 ea 10# x10 ea 10# x10 ea     Leg press 80# 3x10 85# 3x10  85# 3x10    90# 3x10  100# 3x1  100# 3x10  100# 3x10      Hip ABD 20# 3x10 20# 3x10  20# 3x10  25# 3x10 25# 3x10  25# 3x10  25# 3x10  25# 3x10      Cat cow  2x10 2x10  2x10           Supine march w/ counter wt     5# 2x10 5# 2x10 7.5# 2x10        Prone lying + MHP 2x8min 2x8 min  2x8min 2x8min 2x8min x10min x10min  x10min     Prone on elbows x10 X10  X10  X10  x10 x10 x12 x10                  Ther Activity                                       Gait Training                                       Modalities

## 2025-05-19 ENCOUNTER — OFFICE VISIT (OUTPATIENT)
Dept: PHYSICAL THERAPY | Age: 80
End: 2025-05-19
Attending: INTERNAL MEDICINE
Payer: COMMERCIAL

## 2025-05-19 DIAGNOSIS — M25.552 LEFT HIP PAIN: ICD-10-CM

## 2025-05-19 DIAGNOSIS — M54.16 LUMBAR RADICULOPATHY: Primary | ICD-10-CM

## 2025-05-19 PROCEDURE — 97140 MANUAL THERAPY 1/> REGIONS: CPT

## 2025-05-19 PROCEDURE — 97110 THERAPEUTIC EXERCISES: CPT

## 2025-05-19 NOTE — PROGRESS NOTES
Daily Note     Today's date: 2025  Patient name: Milka Johnson  : 1945  MRN: [id number]  Referring provider: Fernando Lobato MD  Dx:   Encounter Diagnosis     ICD-10-CM    1. Lumbar radiculopathy  M54.16       2. Left hip pain  M25.552           Start Time: 1515  Stop Time: 1600  Total time in clinic (min): 45 minutes    Subjective: Patient reports she is sore from her workout yesterday.       Objective: See treatment diary below      Assessment: Tolerated treatment well. Continues to make improvements in lumbar extension mobility. Patient demonstrated fatigue post treatment, exhibited good technique with therapeutic exercises, and would benefit from continued PT      Plan: Continue per plan of care.  Progress treatment as tolerated.       Precautions: NA      Manuals 4/14 4/17  4/28  5/1 5/5 5/7 5/12 5/15 519     PA Mobs MS MS MS MS MS MS MS MS MS                                           Neuro Re-Ed                                                                                                        Ther Ex             HEP             Nustep              Bike 5min 5min 5min 5min 5min 5min 5min 5min 5min    Slant board              HS stretch              Hip flexor stretch              Standing lumbar extension 3x10 3x10  3x10  2x10  2x10 2x10 2x10 2x10 2x10    Retro walkouts 15# x10 15# x10 15# x10  18# x10  15# x10 15# x10  18# 2x10 16# 2x10  18# 2x10    Paloff press 7# x10ea 7# x10ea 8# x10ea 8# x10 ea 8# 2x10ea  8# 2x10 ea 10# x10 ea 10# x10 ea 6# x10ea    Leg press 80# 3x10 85# 3x10  85# 3x10    90# 3x10  100# 3x1  100# 3x10  100# 3x10  100# 3x10    Hip ABD 20# 3x10 20# 3x10  20# 3x10  25# 3x10 25# 3x10  25# 3x10  25# 3x10  25# 3x10  25# 3x10    Cat cow  2x10 2x10  2x10           Supine march w/ counter wt     5# 2x10 5# 2x10 7.5# 2x10        Prone lying + MHP 2x8min 2x8 min  2x8min 2x8min 2x8min x10min x10min x10min x10min    Prone on elbows x10 X10  X10  X10  x10 x10 x12 x10 x10                  Ther Activity                                       Gait Training                                       Modalities

## 2025-05-22 ENCOUNTER — OFFICE VISIT (OUTPATIENT)
Dept: PHYSICAL THERAPY | Age: 80
End: 2025-05-22
Attending: INTERNAL MEDICINE
Payer: COMMERCIAL

## 2025-05-22 DIAGNOSIS — M25.552 LEFT HIP PAIN: ICD-10-CM

## 2025-05-22 DIAGNOSIS — M54.16 LUMBAR RADICULOPATHY: Primary | ICD-10-CM

## 2025-05-22 PROCEDURE — 97110 THERAPEUTIC EXERCISES: CPT

## 2025-05-22 PROCEDURE — 97140 MANUAL THERAPY 1/> REGIONS: CPT

## 2025-05-22 NOTE — PROGRESS NOTES
Daily Note     Today's date: 2025  Patient name: Milka Johnson  : 1945  MRN: [id number]  Referring provider: Fernando Lobato MD  Dx:   Encounter Diagnosis     ICD-10-CM    1. Lumbar radiculopathy  M54.16       2. Left hip pain  M25.552           Start Time: 1500  Stop Time: 1545  Total time in clinic (min): 45 minutes    Subjective: Patient reports she has a lot of pain in her L thigh today because of the inclement weather. She says she was able to lay prone on two pillows with heat for 10 minutes this morning. She fells like she got good prone press ups. She says she feels like she can take a break from PT once she able to put on her own socks, and she feels like she is close to being able to do that.     Objective: See treatment diary below      Assessment: Tolerated treatment well. Continues to show improvements in activity tolerance. Shows improvements in press up on elbows - lumbar extension mobility with moderate restriction now.Sx reduction by end of session. Patient demonstrated fatigue post treatment, exhibited good technique with therapeutic exercises, and would benefit from continued PT      Plan: Continue per plan of care.  Progress treatment as tolerated.       Precautions: NA      Manuals 4/14 4/17  4/28  5/1 5/5 5/7 5/12 5/15 519     PA Mobs MS MS MS MS MS MS MS MS MS MS                                          Neuro Re-Ed                                                                                                        Ther Ex             HEP             Nustep              Bike 5min 5min 5min 5min 5min 5min 5min 5min 5min 5min   Slant board              HS stretch              Hip flexor stretch              Standing lumbar extension 3x10 3x10  3x10  2x10  2x10 2x10 2x10 2x10 2x10 2x10    Retro walkouts 15# x10 15# x10 15# x10  18# x10  15# x10 15# x10  18# 2x10 16# 2x10  18# 2x10 20# 2x10   Paloff press 7# x10ea 7# x10ea 8# x10ea 8# x10 ea 8# 2x10ea  8# 2x10 ea 10# x10  ea 10# x10 ea 6# x10ea 8# x10ea   Leg press 80# 3x10 85# 3x10  85# 3x10    90# 3x10  100# 3x1  100# 3x10  100# 3x10  100# 3x10 100# 3x10    Hip ABD 20# 3x10 20# 3x10  20# 3x10  25# 3x10 25# 3x10  25# 3x10  25# 3x10  25# 3x10  25# 3x10 20# 3x10   Cat cow  2x10 2x10  2x10           Supine march w/ counter wt     5# 2x10 5# 2x10 7.5# 2x10        Prone lying + MHP 2x8min 2x8 min  2x8min 2x8min 2x8min x10min x10min x10min x10min x10min   Prone on elbows x10 X10  X10  X10  x10 x10 x12 x10 x10 X10                 Ther Activity                                       Gait Training                                       Modalities

## 2025-05-27 ENCOUNTER — OFFICE VISIT (OUTPATIENT)
Dept: PHYSICAL THERAPY | Age: 80
End: 2025-05-27
Attending: INTERNAL MEDICINE
Payer: COMMERCIAL

## 2025-05-27 DIAGNOSIS — M25.552 LEFT HIP PAIN: ICD-10-CM

## 2025-05-27 DIAGNOSIS — M54.16 LUMBAR RADICULOPATHY: Primary | ICD-10-CM

## 2025-05-27 PROCEDURE — 97140 MANUAL THERAPY 1/> REGIONS: CPT

## 2025-05-27 PROCEDURE — 97110 THERAPEUTIC EXERCISES: CPT

## 2025-05-27 NOTE — PROGRESS NOTES
"Daily Note     Today's date: 2025  Patient name: Milka Johnson  : 1945  MRN: [id number]  Referring provider: Fernando Lobato MD  Dx:   Encounter Diagnosis     ICD-10-CM    1. Lumbar radiculopathy  M54.16       2. Left hip pain  M25.552           Start Time: 1345  Stop Time: 1430  Total time in clinic (min): 45 minutes    Subjective: Patient reports she played 9 holes of golf this morning and is going to play bocce this afternoon. She has 3 goals for herself: 1) play 18 holes of golf without pain, 2) put on her sock and shoe independently, 3) walk 1 1/4 miles trail around neighborhood without pain. She says she feels like she is close to being able to play 18 holes straight and put on her sock by herself. She says she tried walking around the trail this past weekend and needed to take a sitting rest break about 2/3 way through, and then she was able to complete the walk.       Objective: See treatment diary below      Assessment: Tolerated treatment well. Patient tolerates head of table being propped up 1\" and 2 pillow under chest during prone lying without sx aggravation. Patient demonstrates lumbar extension mobility with moderate restriction. Continues to make improvements in activity tolerance. Patient demonstrated fatigue post treatment, exhibited good technique with therapeutic exercises, and would benefit from continued PT      Plan: Continue per plan of care.  Progress treatment as tolerated.       Precautions: NA      Manuals       5 5/15 519     PA Mobs MS      MS MS MS MS                                          Neuro Re-Ed                                                                                                        Ther Ex             HEP             Nustep              Bike       5min 5min 5min 5min   Slant board              HS stretch              Hip flexor stretch              Standing lumbar extension 2x10      2x10 2x10 2x10 2x10    Retro walkouts 18# x10     "  18# 2x10 16# 2x10  18# 2x10 20# 2x10   Paloff press 9# x10ea      10# x10 ea 10# x10 ea 6# x10ea 8# x10ea   Leg press 100# 3x10       100# 3x10  100# 3x10  100# 3x10 100# 3x10    Hip ABD 25# 3x10       25# 3x10  25# 3x10  25# 3x10 20# 3x10   Cat cow              Supine march w/ counter wt              Prone lying + MHP 10min      x10min x10min x10min x10min   Prone on elbows x10      x12 x10 x10 X10                 Ther Activity                                       Gait Training                                       Modalities

## 2025-05-29 ENCOUNTER — OFFICE VISIT (OUTPATIENT)
Dept: PHYSICAL THERAPY | Age: 80
End: 2025-05-29
Attending: INTERNAL MEDICINE
Payer: COMMERCIAL

## 2025-05-29 DIAGNOSIS — M25.552 LEFT HIP PAIN: ICD-10-CM

## 2025-05-29 DIAGNOSIS — M54.16 LUMBAR RADICULOPATHY: Primary | ICD-10-CM

## 2025-05-29 PROCEDURE — 97140 MANUAL THERAPY 1/> REGIONS: CPT

## 2025-05-29 PROCEDURE — 97110 THERAPEUTIC EXERCISES: CPT

## 2025-05-29 NOTE — PROGRESS NOTES
Daily Note     Today's date: 2025  Patient name: Milka Johnson  : 1945  MRN: [id number]  Referring provider: Fernando Lobato MD  Dx:   Encounter Diagnosis     ICD-10-CM    1. Lumbar radiculopathy  M54.16       2. Left hip pain  M25.552           Start Time: 1400  Stop Time: 1445  Total time in clinic (min): 45 minutes    Subjective: Patient reports she was standing to cook all morning so her L thigh is feeling crampy. Patient reports she was able to put her socks on by herself this morning.       Objective: See treatment diary below      Assessment: Tolerated treatment well. Continues to improve activity tolerance. Sx reduction by end of session. Patient demonstrated fatigue post treatment, exhibited good technique with therapeutic exercises, and would benefit from continued PT      Plan: Continue per plan of care.  Progress treatment as tolerated.       Precautions: NA      Manuals            PA Mobs MS MS                                                  Neuro Re-Ed                                                                                                        Ther Ex             HEP             Nustep              Bike  5min           Standing lumbar extension 2x10 2x10           Retro walkouts 18# x10 20# x10           Paloff press 9# x10ea 10# x10ea           Leg press 100# 3x10  100# 3x10            Hip ABD 25# 3x10  25# 3x10            Cat cow              Supine march w/ counter wt              Prone lying + MHP 10min 10min           Prone on elbows x10 X10                         Ther Activity                                       Gait Training                                       Modalities

## 2025-06-02 ENCOUNTER — APPOINTMENT (OUTPATIENT)
Dept: PHYSICAL THERAPY | Age: 80
End: 2025-06-02
Attending: INTERNAL MEDICINE
Payer: COMMERCIAL

## 2025-06-02 ENCOUNTER — OFFICE VISIT (OUTPATIENT)
Dept: PHYSICAL THERAPY | Age: 80
End: 2025-06-02
Attending: INTERNAL MEDICINE
Payer: COMMERCIAL

## 2025-06-02 DIAGNOSIS — M25.552 LEFT HIP PAIN: ICD-10-CM

## 2025-06-02 DIAGNOSIS — M54.16 LUMBAR RADICULOPATHY: Primary | ICD-10-CM

## 2025-06-02 PROCEDURE — 97140 MANUAL THERAPY 1/> REGIONS: CPT

## 2025-06-02 PROCEDURE — 97110 THERAPEUTIC EXERCISES: CPT

## 2025-06-02 NOTE — PROGRESS NOTES
Daily Note     Today's date: 2025  Patient name: Milka Johnson  : 1945  MRN: [id number]  Referring provider: Fernando Lobato MD  Dx:   Encounter Diagnosis     ICD-10-CM    1. Lumbar radiculopathy  M54.16       2. Left hip pain  M25.552           Start Time: 1215  Stop Time: 1300  Total time in clinic (min): 45 minutes    Subjective: Patient feels good. She says she had a good workout this morning at home.       Objective: See treatment diary below      Assessment: Tolerated treatment well. Continues to improve activity tolerance. Sx reduction by end of session. Patient demonstrated fatigue post treatment, exhibited good technique with therapeutic exercises, and would benefit from continued PT      Plan: Continue per plan of care.  Progress treatment as tolerated.       Precautions: NA      Manuals           PA Mobs MS MS MS                                                 Neuro Re-Ed                                                                                                        Ther Ex             HEP             Nustep              Bike  5min 5min          Standing lumbar extension 2x10 2x10 2x10          Retro walkouts 18# x10 20# x10 20# x10           Paloff press 9# x10ea 10# x10ea 10# x10ea           Leg press 100# 3x10  100# 3x10  100# 3x10           Hip ABD 25# 3x10  25# 3x10  25# 3x10           Prone lying + MHP 10min 10min 10min          Prone on elbows x10 X10  X10                        Ther Activity                                       Gait Training                                       Modalities

## 2025-06-05 ENCOUNTER — OFFICE VISIT (OUTPATIENT)
Dept: PHYSICAL THERAPY | Age: 80
End: 2025-06-05
Attending: INTERNAL MEDICINE
Payer: COMMERCIAL

## 2025-06-05 DIAGNOSIS — M25.552 LEFT HIP PAIN: ICD-10-CM

## 2025-06-05 DIAGNOSIS — M54.16 LUMBAR RADICULOPATHY: Primary | ICD-10-CM

## 2025-06-05 PROCEDURE — 97140 MANUAL THERAPY 1/> REGIONS: CPT

## 2025-06-05 PROCEDURE — 97110 THERAPEUTIC EXERCISES: CPT

## 2025-06-05 NOTE — PROGRESS NOTES
Daily Note     Today's date: 2025  Patient name: Milka Johnson  : 1945  MRN: [id number]  Referring provider: Fernando Lobato MD  Dx:   Encounter Diagnosis     ICD-10-CM    1. Lumbar radiculopathy  M54.16       2. Left hip pain  M25.552                      Subjective: Patient reports pain in her thigh today. She has been standing all day cooking and has not done any exercises today.       Objective: See treatment diary below      Assessment: Tolerated treatment well. Patient is stiff in lumbar mobility today but demonstrates improved motion by end of session. Continues to improve activity tolerance. Sx reduction by end of session.  Patient demonstrated fatigue post treatment, exhibited good technique with therapeutic exercises, and would benefit from continued PT      Plan: Continue per plan of care.  Progress treatment as tolerated.       Precautions: NA      Manuals          PA Mobs MS MS MS MS                                                Neuro Re-Ed                                                                                                        Ther Ex             HEP             Nustep              Bike  5min 5min 5min         Standing lumbar extension 2x10 2x10 2x10 2x10         Retro walkouts 18# x10 20# x10 20# x10  20# x10         Paloff press 9# x10ea 10# x10ea 10# x10ea  10# x10ea         Leg press 100# 3x10  100# 3x10  100# 3x10  100# 3x10         Hip ABD 25# 3x10  25# 3x10  25# 3x10  25# 3x10         Prone lying + MHP 10min 10min 10min 10min         Prone on elbows x10 X10  X10  x10                      Ther Activity                                       Gait Training                                       Modalities

## 2025-06-09 ENCOUNTER — OFFICE VISIT (OUTPATIENT)
Dept: PHYSICAL THERAPY | Age: 80
End: 2025-06-09
Attending: INTERNAL MEDICINE
Payer: COMMERCIAL

## 2025-06-09 DIAGNOSIS — M25.552 LEFT HIP PAIN: ICD-10-CM

## 2025-06-09 DIAGNOSIS — M54.16 LUMBAR RADICULOPATHY: Primary | ICD-10-CM

## 2025-06-09 PROCEDURE — 97140 MANUAL THERAPY 1/> REGIONS: CPT

## 2025-06-09 PROCEDURE — 97110 THERAPEUTIC EXERCISES: CPT

## 2025-06-09 NOTE — PROGRESS NOTES
Daily Note     Today's date: 2025  Patient name: Milka Johnson  : 1945  MRN: [id number]  Referring provider: Fernando Lobato MD  Dx:   Encounter Diagnosis     ICD-10-CM    1. Lumbar radiculopathy  M54.16       2. Left hip pain  M25.552           Start Time: 1145  Stop Time: 1225  Total time in clinic (min): 40 minutes    Subjective: Patient reports pain in the L thigh today. She says she felt good after last session. She had a really good workout and played 9 holes of golf without pain. The pain started when she tried walking 1 mile on the treadmill. She forgot to try standing extension to resolve the pain after initial onset.       Objective: See treatment diary below      Assessment: Tolerated treatment well. Encouraged patient to try standing lumbar extensions before and after activities that may aggravate pain. Continues to make improvements in lumbar extension mobility and activity tolerance. Sx reduction by end of session. Patient demonstrated fatigue post treatment, exhibited good technique with therapeutic exercises, and would benefit from continued PT      Plan: Continue per plan of care.  Progress treatment as tolerated.       Precautions: NA      Manuals          PA Mobs MS MS MS MS MS                                               Neuro Re-Ed                                                                                                        Ther Ex             HEP             Nustep              Bike  5min 5min 5min         Standing lumbar extension 2x10 2x10 2x10 2x10 2x10         Retro walkouts 18# x10 20# x10 20# x10  20# x10 20# x10        Paloff press 9# x10ea 10# x10ea 10# x10ea  10# x10ea 10# 2x10 ea        Leg press 100# 3x10  100# 3x10  100# 3x10  100# 3x10 100# 3x10        Hip ABD 25# 3x10  25# 3x10  25# 3x10  25# 3x10 25# 3x10         Prone lying + MHP 10min 10min 10min 10min 10min        Prone on elbows x10 X10  X10  x10 x10                     Ther  Activity                                       Gait Training                                       Modalities

## 2025-06-12 ENCOUNTER — APPOINTMENT (OUTPATIENT)
Dept: PHYSICAL THERAPY | Age: 80
End: 2025-06-12
Attending: INTERNAL MEDICINE
Payer: COMMERCIAL

## 2025-06-16 ENCOUNTER — OFFICE VISIT (OUTPATIENT)
Dept: PHYSICAL THERAPY | Age: 80
End: 2025-06-16
Attending: INTERNAL MEDICINE
Payer: COMMERCIAL

## 2025-06-16 DIAGNOSIS — M54.16 LUMBAR RADICULOPATHY: Primary | ICD-10-CM

## 2025-06-16 DIAGNOSIS — M25.552 LEFT HIP PAIN: ICD-10-CM

## 2025-06-16 PROCEDURE — 97140 MANUAL THERAPY 1/> REGIONS: CPT

## 2025-06-16 PROCEDURE — 97110 THERAPEUTIC EXERCISES: CPT

## 2025-06-16 NOTE — PROGRESS NOTES
"Daily Note     Today's date: 2025  Patient name: Milka Johnson  : 1945  MRN: [id number]  Referring provider: Fernando Lobato MD  Dx:   Encounter Diagnosis     ICD-10-CM    1. Lumbar radiculopathy  M54.16       2. Left hip pain  M25.552           Start Time: 1445  Stop Time: 1530  Total time in clinic (min): 45 minutes    Subjective: Patient states she walked 2 miles straight over the weekend and had no pain. She pushed herself at the gym yesterday so she has muscle soreness today. She has minimal nerve pain in her L leg today.       Objective: See treatment diary below      Assessment: Tolerated treatment well.  Dynamic balance progressed today with waist belt for walkouts. Prone lying progressed with table head inclined 3\". Weighted strengthening exercises skipped today since patient completed them all yesterday at her gym. Continues to improve lumbar extension mobility and activity tolerance. Patient demonstrated fatigue post treatment, exhibited good technique with therapeutic exercises, and would benefit from continued PT      Plan: Continue per plan of care.  Progress treatment as tolerated.       Precautions: NA      Manuals        PA Mobs MS MS MS MS MS                                               Neuro Re-Ed                                                                                                        Ther Ex             HEP             Nustep              Bike  5min 5min 5min  5min       Standing lumbar extension 2x10 2x10 2x10 2x10 2x10  2x10       Retro walkouts 18# x10 20# x10 20# x10  20# x10 20# x10 20# x10       Lateral walk outs      10# x10 ea       Paloff press 9# x10ea 10# x10ea 10# x10ea  10# x10ea 10# 2x10 ea        Leg press 100# 3x10  100# 3x10  100# 3x10  100# 3x10 100# 3x10        Hip ABD 25# 3x10  25# 3x10  25# 3x10  25# 3x10 25# 3x10         Prone lying + MHP 10min 10min 10min 10min 10min 3\" incline  2x8min       Prone on elbows x10 " X10  X10  x10 x10 x10                    Ther Activity                                       Gait Training                                       Modalities

## 2025-06-18 ENCOUNTER — OFFICE VISIT (OUTPATIENT)
Dept: PHYSICAL THERAPY | Age: 80
End: 2025-06-18
Attending: INTERNAL MEDICINE
Payer: COMMERCIAL

## 2025-06-18 DIAGNOSIS — M25.552 LEFT HIP PAIN: ICD-10-CM

## 2025-06-18 DIAGNOSIS — M54.16 LUMBAR RADICULOPATHY: Primary | ICD-10-CM

## 2025-06-18 PROCEDURE — 97110 THERAPEUTIC EXERCISES: CPT

## 2025-06-18 PROCEDURE — 97140 MANUAL THERAPY 1/> REGIONS: CPT

## 2025-06-18 NOTE — PROGRESS NOTES
"Daily Note     Today's date: 2025  Patient name: Milka Johnson  : 1945  MRN: [id number]  Referring provider: Fernando Lobato MD  Dx:   Encounter Diagnosis     ICD-10-CM    1. Lumbar radiculopathy  M54.16       2. Left hip pain  M25.552           Start Time: 1430  Stop Time: 1515  Total time in clinic (min): 45 minutes    Subjective: Patient reports no pain today. She says she feels really good. She says she can put 3/4 of her sock on by herself.       Objective: See treatment diary below      Assessment: Tolerated treatment well. Continues to tolerate lumbar extension mobility progressions without increased pain. Completes today's session without sx reproduction. Patient demonstrated fatigue post treatment, exhibited good technique with therapeutic exercises, and would benefit from continued PT      Plan: Continue per plan of care.  Progress treatment as tolerated.       Precautions: NA      Manuals       PA Mobs MS MS MS MS MS MS MS                                             Neuro Re-Ed                                                                                                        Ther Ex             HEP             Nustep              Bike  5min 5min 5min  5min 5min      Standing lumbar extension 2x10 2x10 2x10 2x10 2x10  2x10 3x10      Retro walkouts 18# x10 20# x10 20# x10  20# x10 20# x10 20# x10 22# x10      Lateral walk outs      10# x10 ea 10# x10 ea      Paloff press 9# x10ea 10# x10ea 10# x10ea  10# x10ea 10# 2x10 ea        Leg press 100# 3x10  100# 3x10  100# 3x10  100# 3x10 100# 3x10  100# 3x10       Hip ABD 25# 3x10  25# 3x10  25# 3x10  25# 3x10 25# 3x10   25# 3x10      Prone lying + MHP 10min 10min 10min 10min 10min 3\" incline  2x8min 3\" incline 2x8min      Prone on elbows x10 X10  X10  x10 x10 x10 2x10                   Ther Activity                                       Gait Training                                       Modalities           "

## 2025-06-23 ENCOUNTER — OFFICE VISIT (OUTPATIENT)
Dept: PHYSICAL THERAPY | Age: 80
End: 2025-06-23
Attending: INTERNAL MEDICINE
Payer: COMMERCIAL

## 2025-06-23 DIAGNOSIS — M25.552 LEFT HIP PAIN: ICD-10-CM

## 2025-06-23 DIAGNOSIS — M54.16 LUMBAR RADICULOPATHY: Primary | ICD-10-CM

## 2025-06-23 PROCEDURE — 97110 THERAPEUTIC EXERCISES: CPT

## 2025-06-23 PROCEDURE — 97140 MANUAL THERAPY 1/> REGIONS: CPT

## 2025-06-23 NOTE — PROGRESS NOTES
"Daily Note     Today's date: 2025  Patient name: Milka Johnson  : 1945  MRN: [id number]  Referring provider: Fernando Lobato MD  Dx:   Encounter Diagnosis     ICD-10-CM    1. Lumbar radiculopathy  M54.16       2. Left hip pain  M25.552           Start Time: 1245  Stop Time: 1330  Total time in clinic (min): 45 minutes    Subjective: Patient reports she feels good today. Patient reports she has been going several days without any pain, and then the pain will come back intensely for one day.       Objective: See treatment diary below      Assessment: Tolerated treatment well. Patient continues to make improvements in lumbar extension mobility. Patient demonstrated fatigue post treatment, exhibited good technique with therapeutic exercises, and would benefit from continued PT.      Plan: Continue per plan of care.  Progress treatment as tolerated.       Precautions: NA      Manuals      PA Mobs MS MS MS MS MS MS MS MS                                            Neuro Re-Ed                                                                                                        Ther Ex             HEP             Nustep              Bike  5min 5min 5min  5min 5min 5min     Standing lumbar extension 2x10 2x10 2x10 2x10 2x10  2x10 3x10 3x10     Retro walkouts 18# x10 20# x10 20# x10  20# x10 20# x10 20# x10 22# x10 22# x10      Lateral walk outs      10# x10 ea 10# x10 ea 10# x10 ea     Paloff press 9# x10ea 10# x10ea 10# x10ea  10# x10ea 10# 2x10 ea        Leg press 100# 3x10  100# 3x10  100# 3x10  100# 3x10 100# 3x10  100# 3x10  105# 3x10     Hip ABD 25# 3x10  25# 3x10  25# 3x10  25# 3x10 25# 3x10   25# 3x10 25# 3x10     Prone lying + MHP 10min 10min 10min 10min 10min 3\" incline  2x8min 3\" incline 2x8min 3\" incline x10min     Prone on elbows x10 X10  X10  x10 x10 x10 2x10 2x10                   Ther Activity                                       Gait Training          "                              Modalities

## 2025-06-25 ENCOUNTER — OFFICE VISIT (OUTPATIENT)
Dept: PHYSICAL THERAPY | Age: 80
End: 2025-06-25
Attending: INTERNAL MEDICINE
Payer: COMMERCIAL

## 2025-06-25 DIAGNOSIS — M25.552 LEFT HIP PAIN: ICD-10-CM

## 2025-06-25 DIAGNOSIS — M54.16 LUMBAR RADICULOPATHY: Primary | ICD-10-CM

## 2025-06-25 PROCEDURE — 97110 THERAPEUTIC EXERCISES: CPT

## 2025-06-25 PROCEDURE — 97140 MANUAL THERAPY 1/> REGIONS: CPT

## 2025-06-25 NOTE — PROGRESS NOTES
"Daily Note     Today's date: 2025  Patient name: Milka Johnson  : 1945  MRN: [id number]  Referring provider: Fernando Lobato MD  Dx:   Encounter Diagnosis     ICD-10-CM    1. Lumbar radiculopathy  M54.16       2. Left hip pain  M25.552           Start Time: 1130  Stop Time: 1215  Total time in clinic (min): 45 minutes    Subjective: Patient reports she is pain-free today. She says she has not had any pain for several days. She is able to cross her R leg over her L knee to put on her R shoes. She cannot cross her L leg over her R knee, so she tucks her L foot under her knee to put on her L shoe.       Objective: See treatment diary below      Assessment: Tolerated treatment well. Continues to improve lumbar extension mobility and activity tolerance without sx aggravation. Patient demonstrated fatigue post treatment, exhibited good technique with therapeutic exercises, and would benefit from continued PT      Plan: Continue per plan of care.  Progress treatment as tolerated.       Precautions: NA      Manuals     PA Mobs MS MS MS MS MS MS MS MS MS                                           Neuro Re-Ed                                                                                                        Ther Ex             HEP             Nustep              Bike  5min 5min 5min  5min 5min 5min 5min    Standing lumbar extension 2x10 2x10 2x10 2x10 2x10  2x10 3x10 3x10 3x10    Retro walkouts 18# x10 20# x10 20# x10  20# x10 20# x10 20# x10 22# x10 22# x10  23# x10    Lateral walk outs      10# x10 ea 10# x10 ea 10# x10 ea 12# x10ea    Paloff press 9# x10ea 10# x10ea 10# x10ea  10# x10ea 10# 2x10 ea        Leg press 100# 3x10  100# 3x10  100# 3x10  100# 3x10 100# 3x10  100# 3x10  105# 3x10 110# 3x10    Hip ABD 25# 3x10  25# 3x10  25# 3x10  25# 3x10 25# 3x10   25# 3x10 25# 3x10 25# 3x10    Prone lying + MHP 10min 10min 10min 10min 10min 3\" incline  2x8min 3\" " "incline 2x8min 3\" incline x10min 4\" incline x10min    Prone on elbows x10 X10  X10  x10 x10 x10 2x10 2x10  x10                 Ther Activity                                       Gait Training                                       Modalities                                                                                                  "

## 2025-06-30 ENCOUNTER — OFFICE VISIT (OUTPATIENT)
Dept: PHYSICAL THERAPY | Age: 80
End: 2025-06-30
Attending: INTERNAL MEDICINE
Payer: COMMERCIAL

## 2025-06-30 DIAGNOSIS — M54.16 LUMBAR RADICULOPATHY: Primary | ICD-10-CM

## 2025-06-30 DIAGNOSIS — M25.552 LEFT HIP PAIN: ICD-10-CM

## 2025-06-30 PROCEDURE — 97110 THERAPEUTIC EXERCISES: CPT

## 2025-06-30 PROCEDURE — 97140 MANUAL THERAPY 1/> REGIONS: CPT

## 2025-06-30 NOTE — PROGRESS NOTES
"Daily Note     Today's date: 2025  Patient name: Milka Johnson  : 1945  MRN: [id number]  Referring provider: Fernando Lobato MD  Dx:   Encounter Diagnosis     ICD-10-CM    1. Lumbar radiculopathy  M54.16       2. Left hip pain  M25.552           Start Time: 1445  Stop Time: 1530  Total time in clinic (min): 45 minutes    Subjective: Patient reports she has not had any pain for over a week.      Objective: See treatment diary below      Assessment: Tolerated treatment well. Patient continues to improve exercise tolerance without sx aggravation. Lumbar extension mobility improves. Patient demonstrated fatigue post treatment, exhibited good technique with therapeutic exercises, and would benefit from continued PT      Plan: Continue per plan of care.  Progress treatment as tolerated.       Precautions: NA      Manuals     PA Mobs MS MS MS MS MS MS MS MS MS MS                                          Neuro Re-Ed                                                                                                        Ther Ex             HEP             Nustep              Bike  5min 5min 5min  5min 5min 5min 5min 5min   Standing lumbar extension 2x10 2x10 2x10 2x10 2x10  2x10 3x10 3x10 3x10 2x10    Retro walkouts 18# x10 20# x10 20# x10  20# x10 20# x10 20# x10 22# x10 22# x10  23# x10 22# x10    Lateral walk outs      10# x10 ea 10# x10 ea 10# x10 ea 12# x10ea 12# x10ea   Paloff press 9# x10ea 10# x10ea 10# x10ea  10# x10ea 10# 2x10 ea        Leg press 100# 3x10  100# 3x10  100# 3x10  100# 3x10 100# 3x10  100# 3x10  105# 3x10 110# 3x10 110# 3x10    Hip ABD 25# 3x10  25# 3x10  25# 3x10  25# 3x10 25# 3x10   25# 3x10 25# 3x10 25# 3x10 30# 3x10    Prone lying + MHP 10min 10min 10min 10min 10min 3\" incline  2x8min 3\" incline 2x8min 3\" incline x10min 4\" incline x10min 4\" incline x10min   Prone on elbows x10 X10  X10  x10 x10 x10 2x10 2x10  x10 X10               "   Ther Activity                                       Gait Training                                       Modalities                                                                                                     No

## 2025-07-03 ENCOUNTER — OFFICE VISIT (OUTPATIENT)
Dept: PHYSICAL THERAPY | Age: 80
End: 2025-07-03
Attending: INTERNAL MEDICINE
Payer: COMMERCIAL

## 2025-07-03 DIAGNOSIS — M54.16 LUMBAR RADICULOPATHY: Primary | ICD-10-CM

## 2025-07-03 DIAGNOSIS — M25.552 LEFT HIP PAIN: ICD-10-CM

## 2025-07-03 PROCEDURE — 97110 THERAPEUTIC EXERCISES: CPT

## 2025-07-03 PROCEDURE — 97140 MANUAL THERAPY 1/> REGIONS: CPT

## 2025-07-03 NOTE — PROGRESS NOTES
"Daily Note     Today's date: 7/3/2025  Patient name: Milka Johnson  : 1945  MRN: [id number]  Referring provider: Fernando Lobato MD  Dx:   Encounter Diagnosis     ICD-10-CM    1. Lumbar radiculopathy  M54.16       2. Left hip pain  M25.552           Start Time: 1445  Stop Time: 1530  Total time in clinic (min): 45 minutes    Subjective: Patient reports remarkable decrease in pain since starting PT. She is able to sleep on her side at night.  Patient reports she still has pain when she tries to walk on the treadmill.       Objective: See treatment diary below      Assessment: Tolerated treatment well. Patient demonstrates good posture with ambulation on treadmill. Sx reproduction at 5 minutes is likely due to decreased muscle endurance with prolonged activity. Advised patient to continue to work on activity tolerance and stamina with prolonged activity broken up by exercise breaks. Sx reduction by end of session.  Patient demonstrated fatigue post treatment, exhibited good technique with therapeutic exercises, and would benefit from continued PT      Plan: Continue per plan of care.  Progress treatment as tolerated.       Precautions: NA      Manuals 7/3      6/18 6/23 6/25 6/30    PA Mobs       MS MS MS MS                                          Neuro Re-Ed                                                                                                        Ther Ex             HEP             Nustep              Bike 5min      5min 5min 5min 5min   TM walk  5min            Standing lumbar extension 2x10       3x10 3x10 3x10 2x10    Retro walkouts 20# x10       22# x10 22# x10  23# x10 22# x10    Lateral walk outs 12# x10 ea      10# x10 ea 10# x10 ea 12# x10ea 12# x10ea   Paloff press             Leg press 110# 3x10       100# 3x10  105# 3x10 110# 3x10 110# 3x10    Hip ABD       25# 3x10 25# 3x10 25# 3x10 30# 3x10    Prone lying + MHP       3\" incline 2x8min 3\" incline x10min 4\" incline x10min 4\" " incline x10min   Prone on elbows       2x10 2x10  x10 X10                 Ther Activity                                       Gait Training                                       Modalities

## 2025-07-08 ENCOUNTER — OFFICE VISIT (OUTPATIENT)
Dept: PHYSICAL THERAPY | Age: 80
End: 2025-07-08
Attending: INTERNAL MEDICINE
Payer: COMMERCIAL

## 2025-07-08 DIAGNOSIS — M54.16 LUMBAR RADICULOPATHY: Primary | ICD-10-CM

## 2025-07-08 DIAGNOSIS — M25.552 LEFT HIP PAIN: ICD-10-CM

## 2025-07-08 PROCEDURE — 97110 THERAPEUTIC EXERCISES: CPT

## 2025-07-08 PROCEDURE — 97140 MANUAL THERAPY 1/> REGIONS: CPT

## 2025-07-08 NOTE — PROGRESS NOTES
"Daily Note     Today's date: 2025  Patient name: Milka Johnson  : 1945  MRN: [id number]  Referring provider: Fernando Lobato MD  Dx:   Encounter Diagnosis     ICD-10-CM    1. Lumbar radiculopathy  M54.16       2. Left hip pain  M25.552           Start Time: 1600  Stop Time: 1645  Total time in clinic (min): 45 minutes    Subjective: Patient reports she played 9 holes of golf this morning without any pain. She has no pain at the start of today's session.       Objective: See treatment diary below      Assessment: Tolerated treatment well. Patient demonstrates lumbar extension mobility with minimal restriction. Continues to improve activity tolerance without sx reproduction. Patient demonstrated fatigue post treatment, exhibited good technique with therapeutic exercises, and would benefit from continued PT      Plan: Continue per plan of care.  Progress treatment as tolerated.       Precautions: NA      Manuals 7/3 7/8     6/18 6/23 6/25 6/30    PA Mobs MS MS     MS MS MS MS                                          Neuro Re-Ed                                                                                                        Ther Ex             HEP             Nustep              Bike 5min 5min     5min 5min 5min 5min   TM walk  5min            Standing lumbar extension 2x10  3x10     3x10 3x10 3x10 2x10    Retro walkouts 20# x10  18# x10      22# x10 22# x10  23# x10 22# x10    Lateral walk outs 12# x10 ea 18# x10ea      10# x10 ea 10# x10 ea 12# x10ea 12# x10ea   Leg press 110# 3x10  115# 3x10      100# 3x10  105# 3x10 110# 3x10 110# 3x10    Hip ABD  25# 3x10      25# 3x10 25# 3x10 25# 3x10 30# 3x10    Prone lying + MHP  4\" inclinde x10min      3\" incline 2x8min 3\" incline x10min 4\" incline x10min 4\" incline x10min   Prone on elbows  X10      2x10 2x10  x10 X10                 Ther Activity                                       Gait Training                                       Modalities      "

## 2025-07-16 ENCOUNTER — HOSPITAL ENCOUNTER (OUTPATIENT)
Dept: RADIOLOGY | Age: 80
Discharge: HOME | End: 2025-07-16
Attending: INTERNAL MEDICINE
Payer: COMMERCIAL

## 2025-07-16 VITALS — HEIGHT: 66 IN | WEIGHT: 148 LBS | BODY MASS INDEX: 23.78 KG/M2

## 2025-07-16 DIAGNOSIS — Z12.31 SCREENING MAMMOGRAM FOR BREAST CANCER: ICD-10-CM

## 2025-07-16 PROCEDURE — 77067 SCR MAMMO BI INCL CAD: CPT

## 2025-07-16 PROCEDURE — 77063 BREAST TOMOSYNTHESIS BI: CPT

## 2025-07-17 ENCOUNTER — APPOINTMENT (OUTPATIENT)
Dept: LAB | Facility: CLINIC | Age: 80
End: 2025-07-17
Payer: COMMERCIAL

## 2025-07-17 ENCOUNTER — OFFICE VISIT (OUTPATIENT)
Dept: PHYSICAL THERAPY | Age: 80
End: 2025-07-17
Attending: INTERNAL MEDICINE
Payer: COMMERCIAL

## 2025-07-17 DIAGNOSIS — E78.5 HYPERLIPIDEMIA, UNSPECIFIED HYPERLIPIDEMIA TYPE: ICD-10-CM

## 2025-07-17 DIAGNOSIS — D51.0 PERNICIOUS ANEMIA: ICD-10-CM

## 2025-07-17 DIAGNOSIS — E55.9 AVITAMINOSIS D: ICD-10-CM

## 2025-07-17 DIAGNOSIS — M19.90 SENILE ARTHRITIS: ICD-10-CM

## 2025-07-17 DIAGNOSIS — M54.16 LUMBAR RADICULOPATHY: Primary | ICD-10-CM

## 2025-07-17 DIAGNOSIS — M25.552 LEFT HIP PAIN: ICD-10-CM

## 2025-07-17 DIAGNOSIS — E03.9 MYXEDEMA HEART DISEASE: ICD-10-CM

## 2025-07-17 DIAGNOSIS — I51.9 MYXEDEMA HEART DISEASE: ICD-10-CM

## 2025-07-17 LAB
25(OH)D3 SERPL-MCNC: 64.7 NG/ML (ref 30–100)
ALBUMIN SERPL BCG-MCNC: 3.9 G/DL (ref 3.5–5)
ALP SERPL-CCNC: 64 U/L (ref 34–104)
ALT SERPL W P-5'-P-CCNC: 9 U/L (ref 7–52)
ANION GAP SERPL CALCULATED.3IONS-SCNC: 11 MMOL/L (ref 4–13)
AST SERPL W P-5'-P-CCNC: 17 U/L (ref 13–39)
BILIRUB SERPL-MCNC: 0.62 MG/DL (ref 0.2–1)
BUN SERPL-MCNC: 16 MG/DL (ref 5–25)
CALCIUM SERPL-MCNC: 9.3 MG/DL (ref 8.4–10.2)
CHLORIDE SERPL-SCNC: 104 MMOL/L (ref 96–108)
CHOLEST SERPL-MCNC: 219 MG/DL (ref ?–200)
CO2 SERPL-SCNC: 27 MMOL/L (ref 21–32)
CREAT SERPL-MCNC: 0.72 MG/DL (ref 0.6–1.3)
CRP SERPL QL: 13.5 MG/L
ERYTHROCYTE [DISTWIDTH] IN BLOOD BY AUTOMATED COUNT: 13.2 % (ref 11.6–15.1)
GFR SERPL CREATININE-BSD FRML MDRD: 80 ML/MIN/1.73SQ M
GLUCOSE P FAST SERPL-MCNC: 86 MG/DL (ref 65–99)
HCT VFR BLD AUTO: 39.7 % (ref 34.8–46.1)
HDLC SERPL-MCNC: 85 MG/DL
HGB BLD-MCNC: 12.9 G/DL (ref 11.5–15.4)
LDLC SERPL CALC-MCNC: 109 MG/DL (ref 0–100)
MCH RBC QN AUTO: 32.5 PG (ref 26.8–34.3)
MCHC RBC AUTO-ENTMCNC: 32.5 G/DL (ref 31.4–37.4)
MCV RBC AUTO: 100 FL (ref 82–98)
NONHDLC SERPL-MCNC: 134 MG/DL
PLATELET # BLD AUTO: 207 THOUSANDS/UL (ref 149–390)
PMV BLD AUTO: 10.2 FL (ref 8.9–12.7)
POTASSIUM SERPL-SCNC: 5 MMOL/L (ref 3.5–5.3)
PROT SERPL-MCNC: 6.8 G/DL (ref 6.4–8.4)
RBC # BLD AUTO: 3.97 MILLION/UL (ref 3.81–5.12)
SODIUM SERPL-SCNC: 142 MMOL/L (ref 135–147)
TRIGL SERPL-MCNC: 125 MG/DL (ref ?–150)
TSH SERPL DL<=0.05 MIU/L-ACNC: 2.94 UIU/ML (ref 0.45–4.5)
VIT B12 SERPL-MCNC: 3735 PG/ML (ref 180–914)
WBC # BLD AUTO: 4.24 THOUSAND/UL (ref 4.31–10.16)

## 2025-07-17 PROCEDURE — 36415 COLL VENOUS BLD VENIPUNCTURE: CPT

## 2025-07-17 PROCEDURE — 80061 LIPID PANEL: CPT

## 2025-07-17 PROCEDURE — 85027 COMPLETE CBC AUTOMATED: CPT

## 2025-07-17 PROCEDURE — 82607 VITAMIN B-12: CPT

## 2025-07-17 PROCEDURE — 86140 C-REACTIVE PROTEIN: CPT

## 2025-07-17 PROCEDURE — 97140 MANUAL THERAPY 1/> REGIONS: CPT

## 2025-07-17 PROCEDURE — 97110 THERAPEUTIC EXERCISES: CPT

## 2025-07-17 PROCEDURE — 84443 ASSAY THYROID STIM HORMONE: CPT

## 2025-07-17 PROCEDURE — 80053 COMPREHEN METABOLIC PANEL: CPT

## 2025-07-17 PROCEDURE — 82306 VITAMIN D 25 HYDROXY: CPT

## 2025-07-17 NOTE — PROGRESS NOTES
"Daily Note     Today's date: 2025  Patient name: Milka Johnson  : 1945  MRN: [id number]  Referring provider: Fernando Lobato MD  Dx:   Encounter Diagnosis     ICD-10-CM    1. Lumbar radiculopathy  M54.16       2. Left hip pain  M25.552           Start Time: 1615  Stop Time: 1700  Total time in clinic (min): 45 minutes    Subjective: Patient report [place]. [facility]       Objective: See treatment diary below      Assessment: Tolerated treatment well. Demonstrates lumbar extension mobility with minimal extension. Continues to improve activity tolerance. Completes today's session without sx re-aggravation. Patient demonstrated fatigue post treatment, exhibited good technique with therapeutic exercises, and would benefit from continued PT      Plan: Continue per plan of care.  Progress treatment as tolerated.       Precautions: NA      Manuals 7/3 7/8 7/17    6/18 6/23 6/25 6/30    PA Mobs MS MS MS    MS MS MS MS                                          Neuro Re-Ed                                                                                                        Ther Ex             HEP             Nustep              Bike 5min 5min 5min    5min 5min 5min 5min   TM walk  5min            Standing lumbar extension 2x10  3x10 3x10    3x10 3x10 3x10 2x10    Retro walkouts 20# x10  18# x10  18# x10    22# x10 22# x10  23# x10 22# x10    Lateral walk outs 12# x10 ea 18# x10ea  14# x10ea    10# x10 ea 10# x10 ea 12# x10ea 12# x10ea   Leg press 110# 3x10  115# 3x10  110# 3x10     100# 3x10  105# 3x10 110# 3x10 110# 3x10    Hip ABD  25# 3x10  25# 3x10    25# 3x10 25# 3x10 25# 3x10 30# 3x10    Prone lying + MHP  4\" inclinde x10min  4\" incline x10min     3\" incline 2x8min 3\" incline x10min 4\" incline x10min 4\" incline x10min   Prone on elbows  X10  x12    2x10 2x10  x10 X10                 Ther Activity           "                             Gait Training                                       Modalities

## 2025-07-28 ENCOUNTER — OFFICE VISIT (OUTPATIENT)
Dept: PHYSICAL THERAPY | Age: 80
End: 2025-07-28
Attending: INTERNAL MEDICINE
Payer: COMMERCIAL

## 2025-07-28 DIAGNOSIS — M25.552 LEFT HIP PAIN: ICD-10-CM

## 2025-07-28 DIAGNOSIS — M54.16 LUMBAR RADICULOPATHY: Primary | ICD-10-CM

## 2025-07-28 PROCEDURE — 97110 THERAPEUTIC EXERCISES: CPT

## 2025-07-28 PROCEDURE — 97140 MANUAL THERAPY 1/> REGIONS: CPT

## 2025-07-28 PROCEDURE — 97112 NEUROMUSCULAR REEDUCATION: CPT

## 2025-07-30 ENCOUNTER — APPOINTMENT (OUTPATIENT)
Dept: LAB | Facility: CLINIC | Age: 80
End: 2025-07-30
Payer: COMMERCIAL

## 2025-07-30 DIAGNOSIS — R79.82 ELEVATED C-REACTIVE PROTEIN: ICD-10-CM

## 2025-07-30 DIAGNOSIS — R79.82 ELEVATED C-REACTIVE PROTEIN (CRP): ICD-10-CM

## 2025-07-30 LAB — ERYTHROCYTE [SEDIMENTATION RATE] IN BLOOD: 15 MM/HOUR (ref 0–29)

## 2025-07-30 PROCEDURE — 85652 RBC SED RATE AUTOMATED: CPT

## 2025-07-30 PROCEDURE — 86334 IMMUNOFIX E-PHORESIS SERUM: CPT

## 2025-07-30 PROCEDURE — 84165 PROTEIN E-PHORESIS SERUM: CPT

## 2025-07-30 PROCEDURE — 36415 COLL VENOUS BLD VENIPUNCTURE: CPT

## 2025-07-30 PROCEDURE — 86140 C-REACTIVE PROTEIN: CPT

## 2025-07-31 LAB
ALBUMIN SERPL ELPH-MCNC: 4.28 G/DL (ref 3.2–5.1)
ALBUMIN SERPL ELPH-MCNC: 61.2 % (ref 48–70)
ALPHA1 GLOB SERPL ELPH-MCNC: 0.3 G/DL (ref 0.15–0.47)
ALPHA1 GLOB SERPL ELPH-MCNC: 4.3 % (ref 1.8–7)
ALPHA2 GLOB SERPL ELPH-MCNC: 0.64 G/DL (ref 0.42–1.04)
ALPHA2 GLOB SERPL ELPH-MCNC: 9.1 % (ref 5.9–14.9)
BETA GLOB ABNORMAL SERPL ELPH-MCNC: 0.43 G/DL (ref 0.31–0.57)
BETA1 GLOB SERPL ELPH-MCNC: 6.1 % (ref 4.7–7.7)
BETA2 GLOB SERPL ELPH-MCNC: 4.9 % (ref 3.1–7.9)
BETA2+GAMMA GLOB SERPL ELPH-MCNC: 0.34 G/DL (ref 0.2–0.58)
CRP SERPL QL: 1.6 MG/L
GAMMA GLOB ABNORMAL SERPL ELPH-MCNC: 1.01 G/DL (ref 0.4–1.66)
GAMMA GLOB SERPL ELPH-MCNC: 14.4 % (ref 6.9–22.3)
IGG/ALB SER: 1.58 {RATIO} (ref 1.1–1.8)
PROT SERPL-MCNC: 7 G/DL (ref 6.4–8.4)

## 2025-07-31 PROCEDURE — 86334 IMMUNOFIX E-PHORESIS SERUM: CPT | Performed by: STUDENT IN AN ORGANIZED HEALTH CARE EDUCATION/TRAINING PROGRAM

## 2025-07-31 PROCEDURE — 84165 PROTEIN E-PHORESIS SERUM: CPT | Performed by: STUDENT IN AN ORGANIZED HEALTH CARE EDUCATION/TRAINING PROGRAM

## 2025-08-04 ENCOUNTER — OFFICE VISIT (OUTPATIENT)
Dept: PHYSICAL THERAPY | Age: 80
End: 2025-08-04
Attending: INTERNAL MEDICINE
Payer: COMMERCIAL

## 2025-08-04 DIAGNOSIS — M54.16 LUMBAR RADICULOPATHY: Primary | ICD-10-CM

## 2025-08-04 DIAGNOSIS — M25.552 LEFT HIP PAIN: ICD-10-CM

## 2025-08-04 PROCEDURE — 97140 MANUAL THERAPY 1/> REGIONS: CPT

## 2025-08-04 PROCEDURE — 97110 THERAPEUTIC EXERCISES: CPT

## 2025-08-04 PROCEDURE — 97112 NEUROMUSCULAR REEDUCATION: CPT

## 2025-08-11 ENCOUNTER — OFFICE VISIT (OUTPATIENT)
Dept: PHYSICAL THERAPY | Age: 80
End: 2025-08-11
Attending: INTERNAL MEDICINE
Payer: COMMERCIAL